# Patient Record
Sex: FEMALE | Race: OTHER | Employment: UNEMPLOYED | URBAN - METROPOLITAN AREA
[De-identification: names, ages, dates, MRNs, and addresses within clinical notes are randomized per-mention and may not be internally consistent; named-entity substitution may affect disease eponyms.]

---

## 2018-05-14 NOTE — LETTER
Date & Time: 7/16/2024, 10:06 PM  Patient: Trinity Smith  Encounter Provider(s):    Joseph Yusuf MD       To Whom It May Concern:    Trinity Smith was seen and treated in our department on 7/16/2024. She should not return to work until 07/22/2024 .    If you have any questions or concerns, please do not hesitate to call.        _____________________________  Physician/APC Signature            Consent (Lip)/Introductory Paragraph: The rationale for Mohs was explained to the patient and consent was obtained. The risks, benefits and alternatives to therapy were discussed in detail. Specifically, the risks of lip deformity, changes in the oral aperture, infection, scarring, bleeding, prolonged wound healing, incomplete removal, allergy to anesthesia, nerve injury and recurrence were addressed. Prior to the procedure, the treatment site was clearly identified and confirmed by the patient using a hand mirror. All components of Universal Protocol/PAUSE Rule completed.

## 2019-11-16 ENCOUNTER — LAB ENCOUNTER (OUTPATIENT)
Dept: LAB | Age: 54
End: 2019-11-16
Attending: FAMILY MEDICINE
Payer: COMMERCIAL

## 2019-11-16 ENCOUNTER — OFFICE VISIT (OUTPATIENT)
Dept: FAMILY MEDICINE CLINIC | Facility: CLINIC | Age: 54
End: 2019-11-16
Payer: COMMERCIAL

## 2019-11-16 VITALS
TEMPERATURE: 98 F | HEIGHT: 66 IN | DIASTOLIC BLOOD PRESSURE: 80 MMHG | BODY MASS INDEX: 31.98 KG/M2 | SYSTOLIC BLOOD PRESSURE: 129 MMHG | HEART RATE: 70 BPM | WEIGHT: 199 LBS

## 2019-11-16 DIAGNOSIS — E55.9 VITAMIN D DEFICIENCY: ICD-10-CM

## 2019-11-16 DIAGNOSIS — E11.9 CONTROLLED TYPE 2 DIABETES MELLITUS WITHOUT COMPLICATION, WITHOUT LONG-TERM CURRENT USE OF INSULIN (HCC): ICD-10-CM

## 2019-11-16 DIAGNOSIS — I10 ESSENTIAL HYPERTENSION: Primary | ICD-10-CM

## 2019-11-16 PROCEDURE — 82570 ASSAY OF URINE CREATININE: CPT

## 2019-11-16 PROCEDURE — 99203 OFFICE O/P NEW LOW 30 MIN: CPT | Performed by: FAMILY MEDICINE

## 2019-11-16 PROCEDURE — 82306 VITAMIN D 25 HYDROXY: CPT

## 2019-11-16 PROCEDURE — 84443 ASSAY THYROID STIM HORMONE: CPT

## 2019-11-16 PROCEDURE — 80061 LIPID PANEL: CPT

## 2019-11-16 PROCEDURE — 83036 HEMOGLOBIN GLYCOSYLATED A1C: CPT

## 2019-11-16 PROCEDURE — 90686 IIV4 VACC NO PRSV 0.5 ML IM: CPT | Performed by: FAMILY MEDICINE

## 2019-11-16 PROCEDURE — 36415 COLL VENOUS BLD VENIPUNCTURE: CPT

## 2019-11-16 PROCEDURE — 80053 COMPREHEN METABOLIC PANEL: CPT

## 2019-11-16 PROCEDURE — 82043 UR ALBUMIN QUANTITATIVE: CPT

## 2019-11-16 PROCEDURE — 85025 COMPLETE CBC W/AUTO DIFF WBC: CPT

## 2019-11-16 PROCEDURE — 90471 IMMUNIZATION ADMIN: CPT | Performed by: FAMILY MEDICINE

## 2019-11-16 RX ORDER — TELMISARTAN AND HYDROCHLORTHIAZIDE 80; 25 MG/1; MG/1
TABLET ORAL
Refills: 1 | COMMUNITY
Start: 2019-08-04 | End: 2019-11-16

## 2019-11-16 RX ORDER — FENOFIBRATE 160 MG/1
TABLET ORAL
Refills: 5 | COMMUNITY
Start: 2019-08-04 | End: 2019-11-16

## 2019-11-16 RX ORDER — METOPROLOL SUCCINATE 50 MG/1
TABLET, EXTENDED RELEASE ORAL
Qty: 90 TABLET | Refills: 2 | Status: SHIPPED | OUTPATIENT
Start: 2019-11-16 | End: 2020-08-15

## 2019-11-16 RX ORDER — ERGOCALCIFEROL 1.25 MG/1
CAPSULE ORAL
Qty: 12 CAPSULE | Refills: 3 | Status: SHIPPED | OUTPATIENT
Start: 2019-11-16 | End: 2020-08-15

## 2019-11-16 RX ORDER — ERGOCALCIFEROL 1.25 MG/1
CAPSULE ORAL
Refills: 3 | COMMUNITY
Start: 2019-08-03 | End: 2019-11-16

## 2019-11-16 RX ORDER — FENOFIBRATE 160 MG/1
TABLET ORAL
Qty: 90 TABLET | Refills: 2 | Status: SHIPPED | OUTPATIENT
Start: 2019-11-16 | End: 2020-08-15

## 2019-11-16 RX ORDER — HYDROCHLOROTHIAZIDE 25 MG/1
TABLET ORAL
COMMUNITY
End: 2019-11-16

## 2019-11-16 RX ORDER — ASPIRIN 81 MG/1
TABLET ORAL
COMMUNITY
Start: 2011-09-03 | End: 2019-11-16

## 2019-11-16 RX ORDER — METOPROLOL SUCCINATE 50 MG/1
TABLET, EXTENDED RELEASE ORAL
Refills: 1 | COMMUNITY
Start: 2019-08-04 | End: 2019-11-16

## 2019-11-16 RX ORDER — TELMISARTAN AND HYDROCHLORTHIAZIDE 80; 25 MG/1; MG/1
TABLET ORAL
Qty: 90 TABLET | Refills: 1 | Status: SHIPPED | OUTPATIENT
Start: 2019-11-16 | End: 2020-05-18

## 2019-11-19 NOTE — PROGRESS NOTES
Diabetes is not well controlled. I would like to add another medication called invokana. It will make her urinate more but it is taking out excess glucose. It will also help her lose more weight.

## 2020-02-01 ENCOUNTER — OFFICE VISIT (OUTPATIENT)
Dept: FAMILY MEDICINE CLINIC | Facility: CLINIC | Age: 55
End: 2020-02-01
Payer: COMMERCIAL

## 2020-02-01 ENCOUNTER — LAB ENCOUNTER (OUTPATIENT)
Dept: LAB | Age: 55
End: 2020-02-01
Attending: FAMILY MEDICINE
Payer: COMMERCIAL

## 2020-02-01 VITALS
DIASTOLIC BLOOD PRESSURE: 56 MMHG | WEIGHT: 193 LBS | HEIGHT: 66 IN | HEART RATE: 64 BPM | TEMPERATURE: 98 F | BODY MASS INDEX: 31.02 KG/M2 | SYSTOLIC BLOOD PRESSURE: 109 MMHG

## 2020-02-01 DIAGNOSIS — Z12.31 BREAST CANCER SCREENING BY MAMMOGRAM: ICD-10-CM

## 2020-02-01 DIAGNOSIS — I10 ESSENTIAL HYPERTENSION: Primary | ICD-10-CM

## 2020-02-01 DIAGNOSIS — E11.9 CONTROLLED TYPE 2 DIABETES MELLITUS WITHOUT COMPLICATION, WITHOUT LONG-TERM CURRENT USE OF INSULIN (HCC): ICD-10-CM

## 2020-02-01 LAB
CHOLEST SMN-MCNC: 168 MG/DL (ref ?–200)
EST. AVERAGE GLUCOSE BLD GHB EST-MCNC: 140 MG/DL (ref 68–126)
HBA1C MFR BLD HPLC: 6.5 % (ref ?–5.7)
HDLC SERPL-MCNC: 41 MG/DL (ref 40–59)
LDLC SERPL CALC-MCNC: 95 MG/DL (ref ?–100)
NONHDLC SERPL-MCNC: 127 MG/DL (ref ?–130)
PATIENT FASTING Y/N/NP: YES
TRIGL SERPL-MCNC: 161 MG/DL (ref 30–149)
VLDLC SERPL CALC-MCNC: 32 MG/DL (ref 0–30)

## 2020-02-01 PROCEDURE — 36415 COLL VENOUS BLD VENIPUNCTURE: CPT

## 2020-02-01 PROCEDURE — 80061 LIPID PANEL: CPT

## 2020-02-01 PROCEDURE — 99213 OFFICE O/P EST LOW 20 MIN: CPT | Performed by: FAMILY MEDICINE

## 2020-02-01 PROCEDURE — 83036 HEMOGLOBIN GLYCOSYLATED A1C: CPT

## 2020-02-01 NOTE — PROGRESS NOTES
Mercedes Weiner is a 48year old female. Patient presents with:  Medication Follow-Up: Invokana    HPI:   Labs done in November hgb a1c was 7. 1. Reports taking invokana now and glucose has improved. Reports in 120s.  Has appt coming up in 2/2020 for eye exam. to auscultation  CARDIO: RRR without murmur  EXTREMITIES: no cyanosis, clubbing or edema  FOOT: normal pedal pulses. Normal sensation. No ulcerations. ASSESSMENT AND PLAN:   1. Essential hypertension  BP stable. Continue current medications.      2. Con

## 2020-05-18 RX ORDER — TELMISARTAN AND HYDROCHLORTHIAZIDE 80; 25 MG/1; MG/1
TABLET ORAL
Qty: 90 TABLET | Refills: 1 | Status: SHIPPED | OUTPATIENT
Start: 2020-05-18 | End: 2020-08-15

## 2020-08-15 ENCOUNTER — OFFICE VISIT (OUTPATIENT)
Dept: FAMILY MEDICINE CLINIC | Facility: CLINIC | Age: 55
End: 2020-08-15
Payer: COMMERCIAL

## 2020-08-15 ENCOUNTER — LAB ENCOUNTER (OUTPATIENT)
Dept: LAB | Age: 55
End: 2020-08-15
Attending: FAMILY MEDICINE
Payer: COMMERCIAL

## 2020-08-15 VITALS
HEART RATE: 61 BPM | HEIGHT: 66 IN | DIASTOLIC BLOOD PRESSURE: 72 MMHG | TEMPERATURE: 98 F | SYSTOLIC BLOOD PRESSURE: 119 MMHG | WEIGHT: 191 LBS | BODY MASS INDEX: 30.7 KG/M2

## 2020-08-15 DIAGNOSIS — E55.9 VITAMIN D DEFICIENCY: ICD-10-CM

## 2020-08-15 DIAGNOSIS — E11.9 CONTROLLED TYPE 2 DIABETES MELLITUS WITHOUT COMPLICATION, WITHOUT LONG-TERM CURRENT USE OF INSULIN (HCC): ICD-10-CM

## 2020-08-15 DIAGNOSIS — I10 ESSENTIAL HYPERTENSION: Primary | ICD-10-CM

## 2020-08-15 LAB
ALBUMIN SERPL-MCNC: 3.4 G/DL (ref 3.4–5)
ALBUMIN/GLOB SERPL: 0.9 {RATIO} (ref 1–2)
ALP LIVER SERPL-CCNC: 38 U/L (ref 41–108)
ALT SERPL-CCNC: 37 U/L (ref 13–56)
ANION GAP SERPL CALC-SCNC: 6 MMOL/L (ref 0–18)
AST SERPL-CCNC: 28 U/L (ref 15–37)
BASOPHILS # BLD AUTO: 0.1 X10(3) UL (ref 0–0.2)
BASOPHILS NFR BLD AUTO: 1.1 %
BILIRUB SERPL-MCNC: 0.3 MG/DL (ref 0.1–2)
BUN BLD-MCNC: 20 MG/DL (ref 7–18)
BUN/CREAT SERPL: 21.1 (ref 10–20)
CALCIUM BLD-MCNC: 8.9 MG/DL (ref 8.5–10.1)
CHLORIDE SERPL-SCNC: 105 MMOL/L (ref 98–112)
CHOLEST SMN-MCNC: 159 MG/DL (ref ?–200)
CO2 SERPL-SCNC: 27 MMOL/L (ref 21–32)
CREAT BLD-MCNC: 0.95 MG/DL (ref 0.55–1.02)
CREAT UR-SCNC: 80.8 MG/DL
DEPRECATED RDW RBC AUTO: 39.6 FL (ref 35.1–46.3)
EOSINOPHIL # BLD AUTO: 0.42 X10(3) UL (ref 0–0.7)
EOSINOPHIL NFR BLD AUTO: 4.6 %
ERYTHROCYTE [DISTWIDTH] IN BLOOD BY AUTOMATED COUNT: 13.6 % (ref 11–15)
EST. AVERAGE GLUCOSE BLD GHB EST-MCNC: 140 MG/DL (ref 68–126)
GLOBULIN PLAS-MCNC: 3.9 G/DL (ref 2.8–4.4)
GLUCOSE BLD-MCNC: 106 MG/DL (ref 70–99)
HBA1C MFR BLD HPLC: 6.5 % (ref ?–5.7)
HCT VFR BLD AUTO: 35.7 % (ref 35–48)
HDLC SERPL-MCNC: 36 MG/DL (ref 40–59)
HGB BLD-MCNC: 11.3 G/DL (ref 12–16)
IMM GRANULOCYTES # BLD AUTO: 0.03 X10(3) UL (ref 0–1)
IMM GRANULOCYTES NFR BLD: 0.3 %
LDLC SERPL CALC-MCNC: 81 MG/DL (ref ?–100)
LYMPHOCYTES # BLD AUTO: 3.1 X10(3) UL (ref 1–4)
LYMPHOCYTES NFR BLD AUTO: 34.3 %
M PROTEIN MFR SERPL ELPH: 7.3 G/DL (ref 6.4–8.2)
MCH RBC QN AUTO: 25.5 PG (ref 26–34)
MCHC RBC AUTO-ENTMCNC: 31.7 G/DL (ref 31–37)
MCV RBC AUTO: 80.6 FL (ref 80–100)
MICROALBUMIN UR-MCNC: 19.1 MG/DL
MICROALBUMIN/CREAT 24H UR-RTO: 236.4 UG/MG (ref ?–30)
MONOCYTES # BLD AUTO: 0.57 X10(3) UL (ref 0.1–1)
MONOCYTES NFR BLD AUTO: 6.3 %
NEUTROPHILS # BLD AUTO: 4.83 X10 (3) UL (ref 1.5–7.7)
NEUTROPHILS # BLD AUTO: 4.83 X10(3) UL (ref 1.5–7.7)
NEUTROPHILS NFR BLD AUTO: 53.4 %
NONHDLC SERPL-MCNC: 123 MG/DL (ref ?–130)
OSMOLALITY SERPL CALC.SUM OF ELEC: 289 MOSM/KG (ref 275–295)
PATIENT FASTING Y/N/NP: YES
PATIENT FASTING Y/N/NP: YES
PLATELET # BLD AUTO: 402 10(3)UL (ref 150–450)
POTASSIUM SERPL-SCNC: 3.6 MMOL/L (ref 3.5–5.1)
RBC # BLD AUTO: 4.43 X10(6)UL (ref 3.8–5.3)
SODIUM SERPL-SCNC: 138 MMOL/L (ref 136–145)
TRIGL SERPL-MCNC: 212 MG/DL (ref 30–149)
TSI SER-ACNC: 2.09 MIU/ML (ref 0.36–3.74)
VLDLC SERPL CALC-MCNC: 42 MG/DL (ref 0–30)
WBC # BLD AUTO: 9.1 X10(3) UL (ref 4–11)

## 2020-08-15 PROCEDURE — 84443 ASSAY THYROID STIM HORMONE: CPT

## 2020-08-15 PROCEDURE — 80053 COMPREHEN METABOLIC PANEL: CPT

## 2020-08-15 PROCEDURE — 82570 ASSAY OF URINE CREATININE: CPT

## 2020-08-15 PROCEDURE — 85025 COMPLETE CBC W/AUTO DIFF WBC: CPT

## 2020-08-15 PROCEDURE — 82306 VITAMIN D 25 HYDROXY: CPT

## 2020-08-15 PROCEDURE — 99214 OFFICE O/P EST MOD 30 MIN: CPT | Performed by: FAMILY MEDICINE

## 2020-08-15 PROCEDURE — 36415 COLL VENOUS BLD VENIPUNCTURE: CPT

## 2020-08-15 PROCEDURE — 3074F SYST BP LT 130 MM HG: CPT | Performed by: FAMILY MEDICINE

## 2020-08-15 PROCEDURE — 3078F DIAST BP <80 MM HG: CPT | Performed by: FAMILY MEDICINE

## 2020-08-15 PROCEDURE — 82043 UR ALBUMIN QUANTITATIVE: CPT

## 2020-08-15 PROCEDURE — 3008F BODY MASS INDEX DOCD: CPT | Performed by: FAMILY MEDICINE

## 2020-08-15 PROCEDURE — 83036 HEMOGLOBIN GLYCOSYLATED A1C: CPT

## 2020-08-15 PROCEDURE — 80061 LIPID PANEL: CPT

## 2020-08-15 RX ORDER — FENOFIBRATE 160 MG/1
TABLET ORAL
Qty: 90 TABLET | Refills: 2 | Status: SHIPPED | OUTPATIENT
Start: 2020-08-15 | End: 2021-05-11

## 2020-08-15 RX ORDER — TELMISARTAN AND HYDROCHLORTHIAZIDE 80; 25 MG/1; MG/1
TABLET ORAL
Qty: 90 TABLET | Refills: 2 | Status: SHIPPED | OUTPATIENT
Start: 2020-08-15 | End: 2021-06-09

## 2020-08-15 RX ORDER — METOPROLOL SUCCINATE 50 MG/1
TABLET, EXTENDED RELEASE ORAL
Qty: 90 TABLET | Refills: 2 | Status: SHIPPED | OUTPATIENT
Start: 2020-08-15 | End: 2021-05-11

## 2020-08-15 RX ORDER — ERGOCALCIFEROL 1.25 MG/1
CAPSULE ORAL
Qty: 12 CAPSULE | Refills: 3 | Status: SHIPPED | OUTPATIENT
Start: 2020-08-15

## 2020-08-15 NOTE — PROGRESS NOTES
Jazmín Kirkpatrick is a 50year old female. Patient presents with:  Diabetes: follow up  Hypertension: follow up    HPI:   Here for DM follow up. Stopped invokana - had a lot of dryness in her throat.  Reports her glucose has been controlled 110 and 95 in after BMI 30.83 kg/m²   GENERAL: well developed, well nourished,in no apparent distress  SKIN: no rashes,no suspicious lesions  NECK: supple,no adenopathy,no bruits  LUNGS: clear to auscultation  CARDIO: RRR without murmur  EXTREMITIES: no cyanosis, clubbing or

## 2020-08-17 LAB — 25(OH)D3 SERPL-MCNC: 40.8 NG/ML (ref 30–100)

## 2020-08-24 NOTE — PROGRESS NOTES
Diabetes is stable with current medications but cholesterol still a bit elevated so I would like to add atorvastatin 10 mg in the evenings. If ok with that please send and repeat lipid panel in 3 months and cmp.  Also can take coenzyme q 10 100 mg daily

## 2020-10-10 ENCOUNTER — HOSPITAL ENCOUNTER (OUTPATIENT)
Dept: MAMMOGRAPHY | Facility: HOSPITAL | Age: 55
Discharge: HOME | End: 2020-10-10
Attending: FAMILY MEDICINE
Payer: COMMERCIAL

## 2020-10-10 DIAGNOSIS — Z12.31 BREAST CANCER SCREENING BY MAMMOGRAM: ICD-10-CM

## 2020-10-10 PROCEDURE — 77067 SCR MAMMO BI INCL CAD: CPT | Performed by: FAMILY MEDICINE

## 2020-10-10 PROCEDURE — 77063 BREAST TOMOSYNTHESIS BI: CPT | Performed by: FAMILY MEDICINE

## 2020-10-17 NOTE — PROGRESS NOTES
There is area of breast different on right compared to left - no specific mass so radiologist requesting old films.

## 2021-04-08 ENCOUNTER — IMMUNIZATION (OUTPATIENT)
Dept: LAB | Age: 56
End: 2021-04-08
Attending: HOSPITALIST
Payer: COMMERCIAL

## 2021-04-08 DIAGNOSIS — Z23 NEED FOR VACCINATION: Primary | ICD-10-CM

## 2021-04-08 PROCEDURE — 0001A SARSCOV2 VAC 30MCG/0.3ML IM: CPT

## 2021-04-29 ENCOUNTER — IMMUNIZATION (OUTPATIENT)
Dept: LAB | Age: 56
End: 2021-04-29
Attending: HOSPITALIST
Payer: COMMERCIAL

## 2021-04-29 DIAGNOSIS — Z23 NEED FOR VACCINATION: Primary | ICD-10-CM

## 2021-04-29 PROCEDURE — 0002A SARSCOV2 VAC 30MCG/0.3ML IM: CPT

## 2021-05-10 NOTE — TELEPHONE ENCOUNTER
•  Metoprolol Succinate ER 50 MG Oral Tablet 24 Hr, TK 1 T PO QD, Disp: 90 tablet, Rfl: 2    •  Fenofibrate 160 MG Oral Tab, TK 1 T PO QD, Disp: 90 tablet, Rfl: 2    •  metFORMIN HCl 1000 MG Oral Tab, TK 1 T PO BID AFTER MEALS, Disp: 180 tablet, Rfl: 2

## 2021-05-11 RX ORDER — FENOFIBRATE 160 MG/1
TABLET ORAL
Qty: 90 TABLET | Refills: 2 | Status: SHIPPED | OUTPATIENT
Start: 2021-05-11

## 2021-05-11 RX ORDER — METOPROLOL SUCCINATE 50 MG/1
TABLET, EXTENDED RELEASE ORAL
Qty: 90 TABLET | Refills: 2 | Status: SHIPPED | OUTPATIENT
Start: 2021-05-11

## 2021-06-05 ENCOUNTER — LAB ENCOUNTER (OUTPATIENT)
Dept: LAB | Age: 56
End: 2021-06-05
Attending: FAMILY MEDICINE
Payer: COMMERCIAL

## 2021-06-05 DIAGNOSIS — E11.9 TYPE 2 DIABETES MELLITUS WITHOUT COMPLICATION, WITHOUT LONG-TERM CURRENT USE OF INSULIN (HCC): ICD-10-CM

## 2021-06-05 PROCEDURE — 80061 LIPID PANEL: CPT

## 2021-06-05 PROCEDURE — 80053 COMPREHEN METABOLIC PANEL: CPT

## 2021-06-05 PROCEDURE — 36415 COLL VENOUS BLD VENIPUNCTURE: CPT

## 2021-06-09 ENCOUNTER — OFFICE VISIT (OUTPATIENT)
Dept: FAMILY MEDICINE CLINIC | Facility: CLINIC | Age: 56
End: 2021-06-09
Payer: COMMERCIAL

## 2021-06-09 VITALS
HEIGHT: 66 IN | BODY MASS INDEX: 30.76 KG/M2 | SYSTOLIC BLOOD PRESSURE: 113 MMHG | DIASTOLIC BLOOD PRESSURE: 68 MMHG | HEART RATE: 63 BPM | TEMPERATURE: 97 F | WEIGHT: 191.38 LBS

## 2021-06-09 DIAGNOSIS — I10 ESSENTIAL HYPERTENSION: ICD-10-CM

## 2021-06-09 DIAGNOSIS — E11.9 CONTROLLED TYPE 2 DIABETES MELLITUS WITHOUT COMPLICATION, WITHOUT LONG-TERM CURRENT USE OF INSULIN (HCC): ICD-10-CM

## 2021-06-09 DIAGNOSIS — Z01.419 ENCOUNTER FOR WELL WOMAN EXAM WITH ROUTINE GYNECOLOGICAL EXAM: Primary | ICD-10-CM

## 2021-06-09 DIAGNOSIS — Z12.31 VISIT FOR SCREENING MAMMOGRAM: ICD-10-CM

## 2021-06-09 DIAGNOSIS — E55.9 VITAMIN D DEFICIENCY: ICD-10-CM

## 2021-06-09 PROCEDURE — 99396 PREV VISIT EST AGE 40-64: CPT | Performed by: FAMILY MEDICINE

## 2021-06-09 PROCEDURE — 83036 HEMOGLOBIN GLYCOSYLATED A1C: CPT | Performed by: FAMILY MEDICINE

## 2021-06-09 PROCEDURE — 3078F DIAST BP <80 MM HG: CPT | Performed by: FAMILY MEDICINE

## 2021-06-09 PROCEDURE — 3008F BODY MASS INDEX DOCD: CPT | Performed by: FAMILY MEDICINE

## 2021-06-09 PROCEDURE — 3074F SYST BP LT 130 MM HG: CPT | Performed by: FAMILY MEDICINE

## 2021-06-09 PROCEDURE — 36415 COLL VENOUS BLD VENIPUNCTURE: CPT | Performed by: FAMILY MEDICINE

## 2021-06-09 RX ORDER — TELMISARTAN AND HYDROCHLORTHIAZIDE 80; 25 MG/1; MG/1
TABLET ORAL
Qty: 90 TABLET | Refills: 2 | Status: SHIPPED | OUTPATIENT
Start: 2021-06-09 | End: 2021-08-18

## 2021-06-09 NOTE — PROGRESS NOTES
HPI:   Peterson Lemus is a 50year old female who presents for a complete physical exam.    Pt here for regular gyne exam and diabetes check up   Checking her glucose regularly and controlled. 110s.    Wt Readings from Last 3 Encounters:  06/09/21 : 191 lb exertion, denies orthopnea  CARDIOVASCULAR: denies chest pain on exertion  GI: denies abdominal pain,denies heartburn  : denies dysuria, vaginal discharge or itching,irregular menses  MUSCULOSKELETAL: denies back pain  NEURO: denies headaches  PSYCHE: de mammogram    - [facility] MAHAMED 2D+3D SCREENING BILAT (CPT=[id number]);  Future       Shameka Friend MD  6/9/2021  9:44 AM

## 2021-06-21 NOTE — PROGRESS NOTES
HPV test was normal/negative but atypical cells on the pap smear and so pap smear should be repeated in 1 year.

## 2021-08-18 NOTE — TELEPHONE ENCOUNTER
Refill passed per [address] Prescriptions   Pending Prescriptions Disp Refills    TELMISARTAN-HCTZ 80-25 MG Oral Tab [Pharmacy Med Name: TELMISARTAN/HCTZ 80-25MG TABS] 90 tablet 2     Sig: TAKE 1 TABLET BY MOUTH EVERY MORNING        Hypertensive Medications Protocol Passed - 8/18/2021 11:39 AM        Passed - CMP or BMP in past 12 months        Passed - Appointment in past 6 or next 3 months        Passed - GFR Non- > 50     Lab Results   Component Value Date    GFRNAA 80 06/05/2021                     Recent Outpatient Visits              2 months ago Encounter for well woman exam with routine gynecological exam    Emanuel Abarca MD    Office Visit    1 year ago Essential hypertension    Latonia Nichole MD    Office Visit    1 year ago Essential hypertension    Melba Nichole Erlene Lesser, MD    Office Visit    1 year ago Essential hypertension    Melba Nichole Erlene Lesser, MD    Office Visit

## 2021-11-06 ENCOUNTER — HOSPITAL ENCOUNTER (OUTPATIENT)
Dept: MAMMOGRAPHY | Facility: HOSPITAL | Age: 56
Discharge: HOME | End: 2021-11-06
Attending: FAMILY MEDICINE
Payer: COMMERCIAL

## 2021-11-06 DIAGNOSIS — Z12.31 VISIT FOR SCREENING MAMMOGRAM: ICD-10-CM

## 2021-11-06 PROCEDURE — 77067 SCR MAMMO BI INCL CAD: CPT | Performed by: FAMILY MEDICINE

## 2021-11-06 PROCEDURE — 77063 BREAST TOMOSYNTHESIS BI: CPT | Performed by: FAMILY MEDICINE

## 2022-02-07 NOTE — TELEPHONE ENCOUNTER
Patient is calling to request a follow up appt for 2/19/22 anytime. Patient has scheduled a follow up appt on 3/10/22 and is on the wait list.  Are your able to see patient on day requested; please advise.

## 2022-02-07 NOTE — TELEPHONE ENCOUNTER
Im already at max for that day. You can offer 3/5 as openings still there (res 24 ok) if needs a Saturday.

## 2022-02-16 NOTE — ASSESSMENT & PLAN NOTE
RX for separate distance and reading glasses per patient's choice. Alternately, patient can try +1.50 over the counter for distance and +3.50 over the counter for reading.

## 2022-02-16 NOTE — PATIENT INSTRUCTIONS
Hyperopia of both eyes with astigmatism and presbyopia  RX for separate distance and reading glasses per patient's choice. Alternately, patient can try +1.50 over the counter for distance and +3.50 over the counter for reading. Age-related nuclear cataract of both eyes  Discussed mild cataracts in both eyes that are not affecting vision and are not surgical at this time. Floater, vitreous, bilateral  No treatment. Diabetes mellitus type 2 without retinopathy ([address]  Diabetes type II: no background of retinopathy, no signs of neovascularization noted. Discussed ocular and systemic benefits of blood sugar control. Diagnosis and treatment discussed in detail with patient.

## 2022-02-17 NOTE — PROGRESS NOTES
Diabetes is worsening. Rest of the labs are stable - will discuss at upcoming appointment. Needs to focus on better diet and more regular exercise. Will review at appt and add medications to lower it further.

## 2022-02-23 NOTE — TELEPHONE ENCOUNTER
please see pt message below and advise. I did call pt pharmacy and was informed that we can try sending two different scripts or you can prescribe a different one.  I have pended the two different script for your review and approval.

## 2022-02-23 NOTE — TELEPHONE ENCOUNTER
Last Vit D level was 50.1 on 2/16/2022. Please review; protocol failed/no protocol.      Requested Prescriptions   Pending Prescriptions Disp Refills    ergocalciferol 1.25 MG (74458 UT) Oral Cap [Pharmacy Med Name: VITAMIN D2 50,000IU (ERGO) CAP RX] 12 capsule 3     Sig: TAKE 1 CAPSULE BY MOUTH EVERY WEEK        There is no refill protocol information for this order       Signed Prescriptions Disp Refills    metoprolol succinate ER 50 MG Oral Tablet 24 Hr 90 tablet 1     Sig: TAKE 1 TABLET BY MOUTH EVERY DAY        Hypertensive Medications Protocol Passed - 2/23/2022  3:41 PM        Passed - CMP or BMP in past 12 months        Passed - Appointment in past 6 or next 3 months        Passed - GFR Non- > 50     Lab Results   Component Value Date    GFRNAA 71 02/16/2022                        Recent Outpatient Visits              1 week ago Diabetes mellitus type 2 without retinopathy [facility])    [facility] Kyle Mckeon MD    Office Visit    8 months ago Encounter for well woman exam with routine gynecological exam    [address] MD    Office Visit    1 year ago Essential hypertension    [address], Gorden Gowers, MD    Office Visit    2 years ago Essential hypertension    [address], Gorden Gowers, MD    Office Visit    2 years ago Essential hypertension    [address], Gorden Gowers, MD    Office Visit             Future Appointments         Provider Department Appt Notes    In 1 week Sonia Sabillon MD [address] follow up BP and diabetes  care partner policy informed  [id number] Per PCP Encounter 2/7/22

## 2022-02-23 NOTE — TELEPHONE ENCOUNTER
Refill passed per [facility] protocol.      Requested Prescriptions   Pending Prescriptions Disp Refills    ERGOCALCIFEROL 1.25 MG (28777 UT) Oral Cap [Pharmacy Med Name: VITAMIN D2 50,000IU (ERGO) CAP RX] 12 capsule 3     Sig: TAKE 1 CAPSULE BY MOUTH EVERY WEEK        There is no refill protocol information for this order        METOPROLOL SUCCINATE ER 50 MG Oral Tablet 24 Hr [Pharmacy Med Name: METOPROLOL ER SUCCINATE 50MG TABS] 90 tablet 2     Sig: TAKE 1 TABLET BY MOUTH EVERY DAY        Hypertensive Medications Protocol Passed - 2/23/2022  3:41 PM        Passed - CMP or BMP in past 12 months        Passed - Appointment in past 6 or next 3 months        Passed - GFR Non- > 50     Lab Results   Component Value Date    [place] [address] 02/16/2022                        Future Appointments         Provider Department Appt Notes    In 1 week Jamil Carter MD [facility], Nikita Castrejon follow up BP and diabetes  care partner policy informed  [id number] Per PCP Encounter 2/7/22             Recent Outpatient Visits              1 week ago Diabetes mellitus type 2 without retinopathy [facility])    [facility] Katherine Chao MD    Office Visit    8 months ago Encounter for well woman exam with routine gynecological exam    Nicholas TranPatrick MD    Office Visit    1 year ago Essential hypertension    [address] Norval Smock, MD    Office Visit    2 years ago Essential hypertension    [address] Norval Smock, MD    Office Visit    2 years ago Essential hypertension    [address] Norval Smock, MD    Office Visit

## 2022-11-20 NOTE — TELEPHONE ENCOUNTER
Refill passed per [facility] protocol.     Requested Prescriptions   Pending Prescriptions Disp Refills    FENOFIBRATE 160 MG Oral Tab [Pharmacy Med Name: FENOFIBRATE 160MG TABLETS] 90 tablet 2     Sig: TAKE 1 TABLET BY MOUTH EVERY DAY       Cholesterol Medication Protocol Passed - 11/19/2022  2:24 PM        Passed - ALT in past 12 months        Passed - LDL in past 12 months        Passed - Last ALT < 80     Lab Results   Component Value Date    ALT 44 02/16/2022             Passed - Last LDL < 130     Lab Results   Component Value Date    LDL 98 02/16/2022             Passed - In person appointment or virtual visit in the past 12 mos or appointment in next 3 mos     Recent Outpatient Visits              8 months ago Controlled type 2 diabetes mellitus without complication, without long-term current use of insulin [facility])    Nima Toro MD    Office Visit    9 months ago Diabetes mellitus type 2 without retinopathy [facility])    [facility] Mukesh Meyers MD    Office Visit    1 year ago Encounter for well woman exam with routine gynecological exam    Nima Toro MD    Office Visit    2 years ago Essential hypertension    Delisa Toro Atha Gulling, MD    Office Visit    2 years ago Essential hypertension    Delisa Toro Atha Gulling, MD    Office Visit          Future Appointments         Provider Department Appt Notes    In 1 month Misty Ibrahim MD [facility], [address], [employer] blood pressure and diabetes follow up / declined to schedule physical at this time                 METFORMIN HCL 1000 MG Oral Tab [Pharmacy Med Name: METFORMIN 1000MG TABLETS] 180 tablet 2     Sig: TAKE 1 TABLET BY MOUTH TWICE DAILY AFTER MEALS       Diabetes Medication Protocol Failed - 11/19/2022  2:24 PM        Failed - Last A1C < 7.5 and within past 6 months     Lab Results   Component Value Date    A1C 7.3 (H) 02/16/2022             Passed - In person appointment or virtual visit in the past 6 mos or appointment in next 3 mos     Recent Outpatient Visits              8 months ago Controlled type 2 diabetes mellitus without complication, without long-term current use of insulin [facility])    Pardeep Alvarado MD    Office Visit    9 months ago Diabetes mellitus type 2 without retinopathy [facility])    [facility] Elle Dunaway MD    Office Visit    1 year ago Encounter for well woman exam with routine gynecological exam    Pardeep Alvarado MD    Office Visit    2 years ago Essential hypertension    Gene Alvarado [place] Parents, MD    Office Visit    2 years ago Essential hypertension    Gene Alvarado [place] Parents, MD    Office Visit          Future Appointments         Provider Department Appt Notes    In 1 month Ana Shepherd MD [address], [place] blood pressure and diabetes follow up / declined to schedule physical at this time               Passed [facility] or GFRNAA > 50     GFR Evaluation  GFRNAA: 71 , resulted on 2/16/2022          Passed - GFR in the past 12 months             Recent Outpatient Visits              8 months ago Controlled type 2 diabetes mellitus without complication, without long-term current use of insulin [facility])    Gene Alvarado [place] Parents, MD    Office Visit    9 months ago Diabetes mellitus type 2 without retinopathy [facility])    [facility] Elle Dunaway MD    Office Visit    1 year ago Encounter for well woman exam with routine gynecological exam    Leticia Velázquez MD    Office Visit    2 years ago Essential hypertension    [address], Le Thomas MD    Office Visit    2 years ago Essential hypertension    [address], Zenon Sanchez MD    Office Visit            Future Appointments         Provider Department Appt Notes    In 1 month Fatemeh Mcintosh MD [facility], [facility], [address], [place] blood pressure and diabetes follow up / declined to schedule physical at this time

## 2022-11-20 NOTE — TELEPHONE ENCOUNTER
Refill passed per [address] [place] protocol.     Requested Prescriptions   Pending Prescriptions Disp Refills    FENOFIBRATE 160 MG Oral Tab [Pharmacy Med Name: FENOFIBRATE 160MG TABLETS] 90 tablet 2     Sig: TAKE 1 TABLET BY MOUTH EVERY DAY       Cholesterol Medication Protocol Passed - 11/19/2022  2:24 PM        Passed - ALT in past 12 months        Passed - LDL in past 12 months        Passed - Last ALT < 80     Lab Results   Component Value Date    ALT 44 02/16/2022             Passed - Last LDL < 130     Lab Results   Component Value Date    LDL 98 02/16/2022             Passed - In person appointment or virtual visit in the past 12 mos or appointment in next 3 mos     Recent Outpatient Visits              8 months ago Controlled type 2 diabetes mellitus without complication, without long-term current use of insulin [facility])    [address] MD    Office Visit    9 months ago Diabetes mellitus type 2 without retinopathy [facility])    [facility] Carolina Castro MD    Office Visit    1 year ago Encounter for well woman exam with routine gynecological exam    [address] MD    Office Visit    2 years ago Essential hypertension    [address] Maron Love, MD    Office Visit    2 years ago Essential hypertension    [address] Maron Love, MD    Office Visit          Future Appointments         Provider Department Appt Notes    In 1 month Cesario Vegas MD [address], [address] blood pressure and diabetes follow up / declined to schedule physical at this time                 METFORMIN HCL 1000 MG Oral Tab [Pharmacy Med Name: METFORMIN 1000MG TABLETS] 180 tablet 2     Sig: TAKE 1 TABLET BY MOUTH TWICE DAILY AFTER MEALS       Diabetes Medication Protocol Failed - 11/19/2022  2:24 PM        Failed - Last A1C < 7.5 and within past 6 months     Lab Results   Component Value Date    A1C 7.3 (H) 02/16/2022             Passed - In person appointment or virtual visit in the past 6 mos or appointment in next 3 mos     Recent Outpatient Visits              8 months ago Controlled type 2 diabetes mellitus without complication, without long-term current use of insulin [facility])    [address] MD    Office Visit    9 months ago Diabetes mellitus type 2 without retinopathy [facility])    [facility] Giovany Ravi MD    Office Visit    1 year ago Encounter for well woman exam with routine gynecological exam    [address] MD    Office Visit    2 years ago Essential hypertension    [address] Anette Divers, MD    Office Visit    2 years ago Essential hypertension    [address] Anette Divers, MD    Office Visit          Future Appointments         Provider Department Appt Notes    In 1 month Edward Martinez MD [place] [address], [place] blood pressure and diabetes follow up / declined to schedule physical at this time               Passed [facility] or GFRNAA > 50     GFR Evaluation  GFRNAA: 71 , resulted on 2/16/2022          Passed - GFR in the past 12 months             Recent Outpatient Visits              8 months ago Controlled type 2 diabetes mellitus without complication, without long-term current use of insulin [facility])    [address] Anette Divers, MD    Office Visit    9 months ago Diabetes mellitus type 2 without retinopathy [facility])    [facility] Giovany Ravi MD    Office Visit    1 year ago Encounter for well woman exam with routine gynecological exam    Lolis Yanez MD    Office Visit    2 years ago Essential hypertension    [facility], [address], Kendy Avila MD    Office Visit    2 years ago Essential hypertension    [address], Kwesi Sanabria MD    Office Visit            Future Appointments         Provider Department Appt Notes    In 1 month Patricia Wolf MD [facility], [facility], [address] blood pressure and diabetes follow up / declined to schedule physical at this time

## 2022-11-20 NOTE — TELEPHONE ENCOUNTER
Please review Protocol Failed/No Protocol        Requested Prescriptions   Pending Prescriptions Disp Refills    metFORMIN HCl 1000 MG Oral Tab [Pharmacy Med Name: METFORMIN 1000MG TABLETS] 180 tablet 2     Sig: TAKE 1 TABLET BY MOUTH TWICE DAILY AFTER MEALS       Diabetes Medication Protocol Failed - 11/19/2022  2:24 PM        Failed - Last A1C < 7.5 and within past 6 months     Lab Results   Component Value Date    A1C 7.3 (H) 02/16/2022             Passed - In person appointment or virtual visit in the past 6 mos or appointment in next 3 mos     Recent Outpatient Visits              8 months ago Controlled type 2 diabetes mellitus without complication, without long-term current use of insulin [facility])    Estrella Hagan MD    Office Visit    9 months ago Diabetes mellitus type 2 without retinopathy [facility])    [facility] Robin Gomez MD    Office Visit    1 year ago Encounter for well woman exam with routine gynecological exam    Estrella Hagan MD    Office Visit    2 years ago Essential hypertension    Patito Hagan Calhoun Grills, MD    Office Visit    2 years ago Essential hypertension    Patito Hagan [place] Grills, MD    Office Visit          Future Appointments         Provider Department Appt Notes    In 1 month Marva Morales MD [facility], [facility], [address] , [place] blood pressure and diabetes follow up / declined to schedule physical at this time               Passed [facility] or GFRNAA > 50     GFR Evaluation  GFRNAA: 71 , resulted on 2/16/2022          Passed - GFR in the past 12 months         Signed Prescriptions Disp Refills    Fenofibrate 160 MG Oral Tab 90 tablet 1     Sig: TAKE 1 TABLET BY MOUTH EVERY DAY       Cholesterol Medication Protocol Passed - 11/19/2022  2:24 PM        Passed - ALT in past 12 months        Passed - LDL in past 12 months        Passed - Last ALT < 80     Lab Results   Component Value Date    ALT 44 02/16/2022             Passed - Last LDL < 130     Lab Results   Component Value Date    LDL 98 02/16/2022             Passed - In person appointment or virtual visit in the past 12 mos or appointment in next 3 mos     Recent Outpatient Visits              8 months ago Controlled type 2 diabetes mellitus without complication, without long-term current use of insulin [facility])    [address] Candi Marcus, MD    Office Visit    9 months ago Diabetes mellitus type 2 without retinopathy [facility])    [facility] Juan Hubbard MD    Office Visit    1 year ago Encounter for well woman exam with routine gynecological exam    [address] Candi Marcus, MD    Office Visit    2 years ago Essential hypertension    [address] Candi Marcus, MD    Office Visit    2 years ago Essential hypertension    [address] Solar, Maynard Sakai, MD    Office Visit          Future Appointments         Provider Department Appt Notes    In 1 month Aj Coleman MD [address], [place] blood pressure and diabetes follow up / declined to schedule physical at this time                    Future Appointments         Provider Department Appt Notes    In 1 month Aj Coleman MD [address], [place] blood pressure and diabetes follow up / declined to schedule physical at this time            Recent Outpatient Visits              8 months ago Controlled type 2 diabetes mellitus without complication, without long-term current use of insulin [facility])    [address] Maynard Sakai, MD    Office Visit    9 months ago Diabetes mellitus type 2 without retinopathy [facility])    [facility] Juan Hubbard MD    Office Visit 1 year ago Encounter for well woman exam with routine gynecological exam    Jaquelin Land MD    Office Visit    2 years ago Essential hypertension    Jaquelin Land MD    Office Visit    2 years ago Essential hypertension    Marshal Kenny, Mark Negrete MD    Office Visit

## 2022-12-07 NOTE — PROGRESS NOTES
Hi Trinity - Diabetes is a bit better controlled but triglycerides are higher. Please make sure you are following low fat healthy diabetic diet. I am placing a referral for Valarie - Diabetes educator/dietician at [facility]. Please call to schedule with her.  Will review at your appointment. - Dr. Santhosh Hurley

## 2023-10-18 NOTE — PROGRESS NOTES
Diabetes can be better - goal A1C below 7. Sending in another medication - jardiance 10 mg daily in morning - will also help with weight loss. Will urinate more with the medication - drink plenty of water. Schedule follow up with me in 3 months. Liver enzymes are mildly elevated concerning for fatty liver disease.  Continue to work on healthy diet and regular exercise. - Dr. Monroe Fam

## 2024-02-28 NOTE — ASSESSMENT & PLAN NOTE
Discussed mild cataracts in both eyes that are not affecting vision and are not surgical at this time.    RX for separate distance and reading glasses per patient's choice.   Patient will get distance only and continue with over the counter glasses for reading.  She could increase her over the counter reading only glasses to +3.50.

## 2024-02-28 NOTE — ASSESSMENT & PLAN NOTE
Diabetes type II: no background of retinopathy, no signs of neovascularization noted.  Discussed ocular and systemic benefits of blood sugar control.  Diagnosis and treatment discussed in detail with patient.

## 2024-02-28 NOTE — PATIENT INSTRUCTIONS
Diabetes mellitus type 2 without retinopathy (HCC)  Diabetes type II: no background of retinopathy, no signs of neovascularization noted.  Discussed ocular and systemic benefits of blood sugar control.  Diagnosis and treatment discussed in detail with patient.      Floater, vitreous, bilateral  No treatment.     Age-related nuclear cataract of both eyes  Discussed mild cataracts in both eyes that are not affecting vision and are not surgical at this time.

## 2024-02-28 NOTE — PROGRESS NOTES
Trinity Smith is a 58 year old female.    HPI:     HPI    Ep here for a diabetic eye exam. She states her vision feels like it has gotten worse for distance. She would like a new RX today.    Pt has been a diabetic for 6 years       Pt's diabetes is currently controlled by pills   Pt checks BS daily   Pt's last blood sugar was 89 on 02/26/24    Last HA1C was 7.1 on 10/14/23  Endocrinologist: none      Last edited by Denia Solorzano on 2/28/2024 11:14 AM.        Patient History:  Past Medical History:   Diagnosis Date    Diabetes (HCC)     Essential hypertension     Hyperlipidemia        Surgical History: Trinity Smith has a past surgical history that includes tubal ligation and colonoscopy (2018).    Family History   Problem Relation Age of Onset    Diabetes Father     Diabetes Mother     Diabetes Daughter     Hypertension Daughter     Diabetes Son     Hypertension Son     Breast Cancer Neg     Ovarian Cancer Neg     Glaucoma Neg     Macular degeneration Neg        Social History:   Social History     Socioeconomic History    Marital status:    Tobacco Use    Smoking status: Never    Smokeless tobacco: Never   Vaping Use    Vaping Use: Never used   Substance and Sexual Activity    Alcohol use: Yes     Comment: social    Drug use: Never       Medications:  Current Outpatient Medications   Medication Sig Dispense Refill    empagliflozin (JARDIANCE) 10 MG Oral Tab Take 1 tablet (10 mg total) by mouth daily. 90 tablet 4    metFORMIN HCl 1000 MG Oral Tab TAKE 1 TABLET BY MOUTH TWICE DAILY AFTER MEALS 180 tablet 4    FARXIGA 10 MG Oral Tab       telmisartan 80 MG Oral Tab Take 1 tablet (80 mg total) by mouth daily. 90 tablet 4    hydroCHLOROthiazide 25 MG Oral Tab Take 1 tablet (25 mg total) by mouth daily. 90 tablet 4    metoprolol succinate ER 50 MG Oral Tablet 24 Hr Take 1 tablet (50 mg total) by mouth daily. 90 tablet 4    ergocalciferol 1.25 MG (27242 UT) Oral Cap TAKE 1 CAPSULE BY MOUTH EVERY  WEEK 12 capsule 3    Fenofibrate 160 MG Oral Tab TAKE 1 TABLET BY MOUTH EVERY DAY 90 tablet 4    Lancets Does not apply Misc Check glucose daily 100 each 4    Glucose Blood (ONETOUCH ULTRA) In Vitro Strip Check glucose daily. 100 each 4    Blood Glucose Monitoring Suppl Does not apply Kit Glucose meter - covered by insurance. 100 lancets and strips with 5 refills. Check glucose daily 1 kit 0    Coenzyme Q10 100 MG Oral Cap  (Patient not taking: Reported on 3/5/2022) 30 capsule 0       Allergies:  Allergies   Allergen Reactions    Atorvastatin SWELLING    Lisinopril Coughing       ROS:       PHYSICAL EXAM:     Base Eye Exam       Visual Acuity (Snellen - Linear)         Right Left    Dist sc 20/50 +2 20/40 -2    Dist cc 20/30 +1 20/30 +2    Dist ph sc 20/25 -3 2030    Near cc 20/30 20/40   NVA done with OTC reading glasses +2.75             Tonometry (Icare, 10:56 AM)         Right Left    Pressure 20 20              Pupils         Pupils    Right PERRL    Left PERRL              Visual Fields         Left Right     Full Full              Extraocular Movement         Right Left     Full, Ortho Full, Ortho              Neuro/Psych       Oriented x3: Yes              Dilation       Both eyes: 1.0% Mydriacyl and 2.5% Warner Synephrine @ 10:55 AM              Dilation #2       Both eyes: 1.0% Mydriacyl and 2.5% Warner Synephrine @ 10:56 AM                  Slit Lamp and Fundus Exam       Slit Lamp Exam         Right Left    Lids/Lashes Dermatochalasis, Meibomian gland dysfunction Dermatochalasis, Meibomian gland dysfunction    Conjunctiva/Sclera Normal Normal    Cornea Clear Clear    Anterior Chamber Deep and quiet Deep and quiet    Iris Normal Normal    Lens Trace Nuclear sclerosis Trace Nuclear sclerosis    Vitreous Vitreous floaters Vitreous floaters              Fundus Exam         Right Left    Disc Good rim, Temporal crescent Good rim, Temporal crescent    C/D Ratio 0.4 0.4    Macula Normal- no BDR Normal- no BDR     Vessels Normal Normal    Periphery Normal Normal                  Refraction       Wearing Rx         Sphere Cylinder Axis    Right +2.75 Sphere     Left +2.75 Sphere       Age: 3yr    Type: OTC reading only              Wearing Rx #2         Sphere Cylinder Axis    Right +1.00 +0.50 100    Left +0.75 +0.50 080      Age: 7yrs    Type: Distance only              Manifest Refraction (Auto)         Sphere Cylinder Randolph Dist VA Add Near VA    Right +1.75 +0.50 090       Left +1.50 +0.75 070                 Manifest Refraction #2         Sphere Cylinder Randolph Dist VA Add Near VA    Right +1.50 +0.50 095 20/20 +2.00 20/20    Left +1.25 +0.75 070 20/20 +2.00 20/20              Final Rx         Sphere Cylinder Randolph Dist VA Near VA    Right +1.50 +0.50 095 20/20     Left +1.25 +0.75 070 20/20       Type: Distance only              Final Rx #2         Sphere Cylinder Randolph Dist VA Near VA    Right +3.50 +0.50 095  20/20    Left +3.25 +0.75 070  20/20      Type: Reading only                     ASSESSMENT/PLAN:     Diagnoses and Plan:     Diabetes mellitus type 2 without retinopathy (HCC)  Diabetes type II: no background of retinopathy, no signs of neovascularization noted.  Discussed ocular and systemic benefits of blood sugar control.  Diagnosis and treatment discussed in detail with patient.      Floater, vitreous, bilateral  No treatment.     Age-related nuclear cataract of both eyes  Discussed mild cataracts in both eyes that are not affecting vision and are not surgical at this time.    RX for separate distance and reading glasses per patient's choice.   Patient will get distance only and continue with over the counter glasses for reading.  She could increase her over the counter reading only glasses to +3.50.      No orders of the defined types were placed in this encounter.      Meds This Visit:  Requested Prescriptions      No prescriptions requested or ordered in this encounter        Follow up instructions:  Return in  about 1 year (around 2/28/2025) for Diabetic eye exam.    2/28/2024  Scribed by: Michael Colón MD

## 2024-03-20 NOTE — PROGRESS NOTES
Trinity Smith is a 58 year old female.   Chief Complaint   Patient presents with    Diabetes     6 months     HPI:   Reports glucose at home is controlled. Eating better and no side effects with medications.   Current Outpatient Medications on File Prior to Visit   Medication Sig Dispense Refill    empagliflozin (JARDIANCE) 10 MG Oral Tab Take 1 tablet (10 mg total) by mouth daily. 90 tablet 4    metFORMIN HCl 1000 MG Oral Tab TAKE 1 TABLET BY MOUTH TWICE DAILY AFTER MEALS 180 tablet 4    FARXIGA 10 MG Oral Tab       telmisartan 80 MG Oral Tab Take 1 tablet (80 mg total) by mouth daily. 90 tablet 4    hydroCHLOROthiazide 25 MG Oral Tab Take 1 tablet (25 mg total) by mouth daily. 90 tablet 4    metoprolol succinate ER 50 MG Oral Tablet 24 Hr Take 1 tablet (50 mg total) by mouth daily. 90 tablet 4    ergocalciferol 1.25 MG (59999 UT) Oral Cap TAKE 1 CAPSULE BY MOUTH EVERY WEEK 12 capsule 3    Fenofibrate 160 MG Oral Tab TAKE 1 TABLET BY MOUTH EVERY DAY 90 tablet 4    Lancets Does not apply Misc Check glucose daily 100 each 4    Glucose Blood (ONETOUCH ULTRA) In Vitro Strip Check glucose daily. 100 each 4    Coenzyme Q10 100 MG Oral Cap  30 capsule 0    Blood Glucose Monitoring Suppl Does not apply Kit Glucose meter - covered by insurance. 100 lancets and strips with 5 refills. Check glucose daily 1 kit 0     No current facility-administered medications on file prior to visit.      Past Medical History:   Diagnosis Date    Diabetes (HCC)     Essential hypertension     Hyperlipidemia       Social History:  Social History     Socioeconomic History    Marital status:    Tobacco Use    Smoking status: Never    Smokeless tobacco: Never   Vaping Use    Vaping Use: Never used   Substance and Sexual Activity    Alcohol use: Yes     Comment: social    Drug use: Never        REVIEW OF SYSTEMS:   GENERAL HEALTH: feels well otherwise  SKIN: denies any unusual skin lesions or rashes  HEENT: denies eye  complaints,denies sore throat, denies ear pain  RESPIRATORY: denies shortness of breath, denies cough  CARDIOVASCULAR: denies chest pain  GI: denies abdominal pain and denies heartburn  NEURO: denies headaches  Musculoskeletal: no joint pain, back pain    EXAM:   /78   Pulse 64   Ht 5' 6\" (1.676 m)   Wt 187 lb (84.8 kg)   LMP 01/01/2014   BMI 30.18 kg/m²   /76   Pulse 64   Ht 5' 6\" (1.676 m)   Wt 187 lb (84.8 kg)   LMP 01/01/2014   BMI 30.18 kg/m²     GENERAL: well developed, well nourished,in no apparent distress  LUNGS: clear to auscultation  CARDIO: RRR without murmur  EXTREMITIES: no cyanosis, clubbing or edema      ASSESSMENT AND PLAN:   1. Diabetes mellitus type 2 without retinopathy (HCC)  Well controlled with current medications. No changes.   - POC Glycohemoglobin [80007]  - CBC With Differential With Platelet; Future  - Comp Metabolic Panel (14); Future  - Lipid Panel; Future  - Microalb/Creat Ratio, Random Urine; Future  - TSH W Reflex To Free T4; Future  - Vitamin D; Future  - Vitamin B12 [E]; Future  - Hemoglobin A1C; Future    2. Vitamin D deficiency    - Vitamin D; Future          The patient indicates understanding of these issues and agrees to the plan.      Ria Bianchi MD  3/20/2024  10:03 AM

## 2024-04-19 NOTE — TELEPHONE ENCOUNTER
do you approve refill request? This medication is on pt med list but it has not been given by you in the past. Please advise

## 2024-06-27 NOTE — TELEPHONE ENCOUNTER
With  ID # [id number]    Action Requested: Summary for Provider     []  Critical Lab, Recommendations Needed  [] Need Additional Advice  [x]   FYI    []   Need Orders  [] Need Medications Sent to Pharmacy  []  Other     SUMMARY: Patient states she has been having rectal pain for 1 week due to constipation/hemorrhoid. Patient denies blood in stool Last bowel movement was today. Patient has tried metamucil, which helped, but then pain came back.     Patient was given care advice (please see care advice for advice given)  Patient was offered an office appointment, but declined at this time. Patient will try home care and call back if symptoms not improving.     Reason for call: Rectal Pain  Onset: 1 week    Reason for Disposition   Patient wants to be seen    Protocols used: Rectal Symptoms-A-OH

## 2024-07-17 NOTE — ED INITIAL ASSESSMENT (HPI)
Patient arrives ambulatory through triage with c/o of L. 3rd and 4th finger injury. Patient states her fingers got caught in a belt at work today around 1600. Fingers swollen, bruised in triage.

## 2024-07-17 NOTE — ED PROVIDER NOTES
Patient Seen in: [facility] Emergency Department    History     Chief Complaint   Patient presents with    Finger Injury       HPI    The patient presents to the ED complaining of an injury to her left third and fourth fingers after getting her fingers caught in a belt at work today.  She states pain is moderate in severity.  Denies other complaints.    History reviewed.   Past Medical History:    Diabetes (HCC)    Essential hypertension    Hyperlipidemia       History reviewed.   Past Surgical History:   Procedure Laterality Date    Colonoscopy  2018    Tubal ligation           Medications :  (Not in a hospital admission)       Family History   Problem Relation Age of Onset    Diabetes Father     Diabetes Mother     Diabetes Daughter     Hypertension Daughter     Diabetes Son     Hypertension Son     Breast Cancer Neg     Ovarian Cancer Neg     Glaucoma Neg     Macular degeneration Neg        Smoking Status:   Social History     Socioeconomic History    Marital status:    Tobacco Use    Smoking status: Never    Smokeless tobacco: Never   Vaping Use    Vaping status: Never Used   Substance and Sexual Activity    Alcohol use: Yes     Comment: social    Drug use: Never       Constitutional and vital signs reviewed.      Social History and Family History elements reviewed from today, pertinent positives to the presenting problem noted.    Physical Exam     ED Triage Vitals [07/16/24 2045]   BP (!) 169/89   Pulse 67   Resp 20   Temp 98.2 °F (36.8 °C)   Temp src    SpO2 99 %   O2 Device        All measures to prevent infection transmission during my interaction with the patient were taken. Handwashing was performed prior to and after the exam.  Stethoscope and any equipment used during my examination was cleaned with super sani-cloth germicidal wipes following the exam.     Physical Exam  Constitutional:       Appearance: Normal appearance.   Pulmonary:      Effort: Pulmonary effort is normal. No  respiratory distress.   Musculoskeletal:         General: Swelling and tenderness present. No deformity.      Comments: Bruising and swelling noted to the left third and fourth fingertips.  Fourth fingertip is more swollen and bruised on the third.  No obvious deformity.  Normal range of motion of the fingers   Neurological:      Mental Status: She is alert. Mental status is at baseline.   Psychiatric:         Mood and Affect: Mood normal.         Behavior: Behavior normal.         ED Course      Labs Reviewed - No data to display    As Interpreted by me    Imaging Results Available and Reviewed while in ED: XR HAND (MIN 3 VIEWS), LEFT (CPT=73130)    Result Date: 7/16/2024  CONCLUSION:  1.  Acute fracture involving the tip/tuft of the left 4th distal phalanx. 2.  Punctate calcification along the volar margin of the third distal interphalangeal joint which could represent foreign body/debris in or along the patient's skin.  It could also represent old injury and correlate for flexor weakness.  Dictated by (CST): Hi Taylor MD on 7/16/2024 at 9:51 PM     Finalized by (CST): Hi Taylor MD on 7/16/2024 at 9:52 PM         ED Medications Administered:   Medications   HYDROcodone-acetaminophen (Norco) 5-325 MG per tab 2 tablet (2 tablets Oral Given 7/16/24 2207)         MDM     Vitals:    07/16/24 2045 07/16/24 2200   BP: (!) 169/89 126/72   Pulse: 67 67   Resp: 20    Temp: 98.2 °F (36.8 °C)    SpO2: 99% 96%     *I personally reviewed and interpreted all ED vitals.    Pulse Ox: 96%, Room air, Normal     Differential Diagnosis/ Diagnostic Considerations: Fingertip crush injuries, fracture, bruising, other    Complicating Factors: The patient already has does not have any pertinent problems on file. to contribute to the complexity of this ED evaluation.    Medical Decision Making  The patient presents to the ED with isolated injuries to her left third and fourth fingertips.  No injury to the nails.  Fracture noted to the  distal phalanx of the fourth finger.  Patient placed in a finger splint and stable for discharge with outpatient follow-up.        Problems Addressed:  Closed nondisplaced fracture of distal phalanx of left ring finger, initial encounter: acute illness or injury  Contusion of left middle finger without damage to nail, initial encounter: acute illness or injury    Amount and/or Complexity of Data Reviewed  Radiology: ordered and independent interpretation performed. Decision-making details documented in ED Course.     Details: I personally reviewed the patient's left hand x-ray images and noted a tuft fracture of the left fourth finger        Condition upon leaving the department: Stable    Disposition and Plan     Clinical Impression:  1. Closed nondisplaced fracture of distal phalanx of left ring finger, initial encounter    2. Contusion of left middle finger without damage to nail, initial encounter        Disposition:  Discharge    Follow-up:  Ria Bianchi MD  [address] [place] [id number]  [phone]    Schedule an appointment as soon as possible for a visit in 3 day(s)      [facility]  [address] [facility] [address]  [phone]  Schedule an appointment as soon as possible for a visit in 2 day(s)        Medications Prescribed:  Discharge Medication List as of 7/16/2024 10:11 PM

## 2024-08-14 NOTE — PROGRESS NOTES
Reviewed at appointment. Diabetes is improving - good job. Cholesterol better controlled also. Continue current medications the same.

## 2024-08-14 NOTE — PROGRESS NOTES
HPI:   Trinity Smith is a 59 year old female who presents for a complete physical exam.    07/01 - internal hemorroids, 07/16- left ringer finger tendon injury and fracture- occurred at work, home /72  Taking her DM medications. Trying to stay active but less since fracture.     Wt Readings from Last 3 Encounters:   08/14/24 193 lb (87.5 kg)   03/20/24 187 lb (84.8 kg)   08/05/23 191 lb 3.2 oz (86.7 kg)     Body mass index is 31.15 kg/m².       Current Outpatient Medications   Medication Sig Dispense Refill    JARDIANCE 10 MG Oral Tab       dapagliflozin (FARXIGA) 10 MG Oral Tab Take 1 tablet (10 mg total) by mouth daily. 90 tablet 4    ergocalciferol 1.25 MG (84036 UT) Oral Cap TAKE 1 CAPSULE BY MOUTH EVERY WEEK 12 capsule 3    Fenofibrate 160 MG Oral Tab TAKE 1 TABLET BY MOUTH EVERY DAY 90 tablet 4    hydroCHLOROthiazide 25 MG Oral Tab Take 1 tablet (25 mg total) by mouth daily. 90 tablet 4    metFORMIN HCl 1000 MG Oral Tab TAKE 1 TABLET BY MOUTH TWICE DAILY AFTER MEALS 180 tablet 4    metoprolol succinate ER 50 MG Oral Tablet 24 Hr Take 1 tablet (50 mg total) by mouth daily. 90 tablet 4    telmisartan 80 MG Oral Tab Take 1 tablet (80 mg total) by mouth daily. 90 tablet 4    Coenzyme Q10 100 MG Oral Cap  30 capsule 0    Blood Glucose Monitoring Suppl Does not apply Kit Glucose meter - Contour or one covered by insurance. 100 lancets and strips with 5 refills. Check glucose daily 1 kit 0    Lancets Does not apply Misc Check glucose daily 100 each 4    Glucose Blood (ONETOUCH ULTRA) In Vitro Strip Check glucose daily. 100 each 4    Blood Glucose Monitoring Suppl Does not apply Kit Glucose meter - covered by insurance. 100 lancets and strips with 5 refills. Check glucose daily 1 kit 0      Past Medical History:    Diabetes (HCC)    Essential hypertension    Hyperlipidemia      Past Surgical History:   Procedure Laterality Date    Colonoscopy  2018    Tubal ligation        Family History   Problem  Relation Age of Onset    Diabetes Father     Diabetes Mother     Diabetes Daughter     Hypertension Daughter     Diabetes Son     Hypertension Son     Breast Cancer Neg     Ovarian Cancer Neg     Glaucoma Neg     Macular degeneration Neg       Social History:   Social History     Socioeconomic History    Marital status:    Tobacco Use    Smoking status: Never    Smokeless tobacco: Never   Vaping Use    Vaping status: Never Used   Substance and Sexual Activity    Alcohol use: Yes     Comment: social    Drug use: Never          REVIEW OF SYSTEMS:   GENERAL: feels well otherwise  Review of Systems   EXAM:   /81   Pulse 66   Ht 5' 6\" (1.676 m)   Wt 193 lb (87.5 kg)   LMP 01/01/2014   BMI 31.15 kg/m²     GENERAL: well developed, well nourished,in no apparent distress  SKIN: no rashes,no suspicious lesions  HEENT: atraumatic, normocephalic,ears and throat are clear  EYES:PERRLA, EOMI, conjunctiva are clear  LUNGS: clear to auscultation  CARDIO: RRR without murmur  GI: good BS's,no masses, HSM or tenderness  EXTREMITIES: no cyanosis, clubbing or edema  NEURO: Oriented times three,cranial nerves are intact,motor and sensory are grossly intact  Bilateral barefoot skin diabetic exam is normal, visualized feet and the appearance is normal.  Bilateral monofilament/sensation of both feet is normal.  Pulsation pedal pulse exam of both lower legs/feet is normal as well.     ASSESSMENT AND PLAN:   Trinity Smith is a 59 year old female who presents for a complete physical exam.    1. Diabetes mellitus type 2 without retinopathy (HCC)  Well controlled.     2. Routine medical exam      3. Essential hypertension  Stable on meds       Ria Bianchi MD  8/14/2024  10:51 AM

## 2025-02-15 NOTE — PROGRESS NOTES
Trinity Smith is a 59 year old female.   Chief Complaint   Patient presents with    Diabetes     6 month f/u      Vaginal Problem     Itching and discharge     HPI:   Recent covid - diagnosed 5 days ago. Reports hitting her hard. Been tired.   Reports being treated for yeast infection in October. Reports when wiping she has some pain and some discharge.   A1C today 6.7. has not been taking farxiga for about 3 weeks.   Medications Ordered Prior to Encounter[1]   Past Medical History:    Diabetes (HCC)    Essential hypertension    Hyperlipidemia      Social History:  Social History     Socioeconomic History    Marital status:    Tobacco Use    Smoking status: Never    Smokeless tobacco: Never   Vaping Use    Vaping status: Never Used   Substance and Sexual Activity    Alcohol use: Yes     Comment: social    Drug use: Never        REVIEW OF SYSTEMS:   Review of Systems   See HPI     EXAM:   /81   Pulse 75   Ht 5' 6\" (1.676 m)   Wt 189 lb (85.7 kg)   LMP 01/01/2014   BMI 30.51 kg/m²   Blood pressure 136/78, pulse 75, height 5' 6\" (1.676 m), weight 189 lb (85.7 kg), last menstrual period 01/01/2014, not currently breastfeeding.    GENERAL: well developed, well nourished,in no apparent distress  LUNGS: clear to auscultation  CARDIO: RRR without murmur  : labia swollen bilaterally mild erythema, thin white vaginal discharge.     ASSESSMENT AND PLAN:   1. Diabetes mellitus type 2 without retinopathy (HCC)  Well controlled. Pt reports taking jardiance and farxiga right now - will call pharmacy to clarify = should only have one of those.   - POC Glycohemoglobin [80100]  - Comp Metabolic Panel (14); Future  - CBC With Differential With Platelet; Future  - Lipid Panel; Future  - Microalb/Creat Ratio, Random Urine; Future    2. COVID  Supportive care     3. Screening mammogram for breast cancer    - [facility] MAHAMED 2D+3D SCREENING BILAT (CPT=77067/07835); Future    4. Acute vaginitis  Sending culture. Tx  with diflucan daily for 7 days as significant inflammation.       The patient indicates understanding of these issues and agrees to the plan.      Ria Bianchi MD  2/15/2025  8:36 AM         [1]   Current Outpatient Medications on File Prior to Visit   Medication Sig Dispense Refill    JARDIANCE 10 MG Oral Tab       dapagliflozin (FARXIGA) 10 MG Oral Tab Take 1 tablet (10 mg total) by mouth daily. 90 tablet 4    ergocalciferol 1.25 MG (34948 UT) Oral Cap TAKE 1 CAPSULE BY MOUTH EVERY WEEK 12 capsule 3    Fenofibrate 160 MG Oral Tab TAKE 1 TABLET BY MOUTH EVERY DAY 90 tablet 4    hydroCHLOROthiazide 25 MG Oral Tab Take 1 tablet (25 mg total) by mouth daily. 90 tablet 4    metFORMIN HCl 1000 MG Oral Tab TAKE 1 TABLET BY MOUTH TWICE DAILY AFTER MEALS 180 tablet 4    metoprolol succinate ER 50 MG Oral Tablet 24 Hr Take 1 tablet (50 mg total) by mouth daily. 90 tablet 4    telmisartan 80 MG Oral Tab Take 1 tablet (80 mg total) by mouth daily. 90 tablet 4    Coenzyme Q10 100 MG Oral Cap  30 capsule 0    Blood Glucose Monitoring Suppl Does not apply Kit Glucose meter - Contour or one covered by insurance. 100 lancets and strips with 5 refills. Check glucose daily 1 kit 0    Lancets Does not apply Misc Check glucose daily 100 each 4    Glucose Blood (ONETOUCH ULTRA) In Vitro Strip Check glucose daily. 100 each 4    Blood Glucose Monitoring Suppl Does not apply Kit Glucose meter - covered by insurance. 100 lancets and strips with 5 refills. Check glucose daily 1 kit 0     No current facility-administered medications on file prior to visit.

## 2025-02-17 NOTE — PROGRESS NOTES
Culture was negative. Can stop the diflucan and use in future if yeast infection returns. - Dr. Bianchi

## 2025-02-23 NOTE — PROGRESS NOTES
Hi Trinity - Your cholesterol is increasing -make sure you are taking fenofibrate every day. Your white blood cells and platelets are elevated right now. May be from recent infection. Will recheck in 1 month. I placed the order. Kidney and liver function are stable. - Dr. Bianchi

## 2025-02-24 NOTE — TELEPHONE ENCOUNTER
Ok will send to Dr. Clovis baker for evaluation. May be related to farxiga but also just started estrogen cream so will take a few weeks to improve symptoms

## 2025-02-26 NOTE — PROGRESS NOTES
[facility]  Obstetrics and Gynecology  Gyne Problem Visit      Trinity Smith is a 59 year old female  is a new patient to me presenting with concerns of vaginal itching and dryness for the last 3 months. Feels dryness and irritation on inner labia. Notes occasional thin white vaginal discharge without odor. No associated urinary symptoms. Only notes irritation with wiping. Sexually active with same partner. She was given estradiol cream by her PCP, Recent negative vaginosis culture. She has been using cream since 2/15 with little to no relief. She applies cream nightly for 1 week, has not used it this week. No recent antibiotic use.     Patient's last menstrual period was 2014.     Pap:   Contraception:tubal ligation    OBSTETRICS HISTORY:  OB History    Para Term  AB Living   3 3 3 0 0 3   SAB IAB Ectopic Multiple Live Births   0 0 0 0 0       GYNE HISTORY:  Hx Prior Abnormal Pap: No   Menarche: 14-16y/o (2025  1:45 PM)  Use of Birth Control (if yes, specify type): Postmenopausal (2025  1:45 PM)  Hx Prior Abnormal Pap: No (2025  1:45 PM)        Latest Ref Rng & Units 2023    10:27 AM 2021     9:58 AM   RECENT PAP RESULTS   INTERPRETATION/RESULT: Negative for intraepithelial lesion or malignancy Negative for intraepithelial lesion or malignancy  Atypical squamous cells of undetermined significance (ASC-US)    HPV Negative Negative  Negative          History   Sexual Activity    Sexual activity: Not on file       MEDICAL HISTORY:  Past Medical History:   Diagnosis Date    Diabetes (HCC)     Essential hypertension     Hyperlipidemia      Past Surgical History:   Procedure Laterality Date    Colonoscopy  2018    Tubal ligation         SOCIAL HISTORY:  Social History     Socioeconomic History    Marital status:      Spouse name: Not on file    Number of children: Not on file    Years of education: Not on file    Highest education level: Not on file    Occupational History    Not on file   Tobacco Use    Smoking status: Never    Smokeless tobacco: Never   Vaping Use    Vaping status: Never Used   Substance and Sexual Activity    Alcohol use: Yes     Comment: social    Drug use: Never    Sexual activity: Not on file   Other Topics Concern    Not on file   Social History Narrative    Not on file     Social Drivers of Health     Food Insecurity: Not on file   Transportation Needs: Not on file   Stress: Not on file   Housing Stability: Not on file       MEDICATIONS:    Current Outpatient Medications:     estradiol 0.1 MG/GM Vaginal Cream, Place 1 g vaginally daily. For 1 week then twice a week, Disp: 42 g, Rfl: 11    dapagliflozin (FARXIGA) 10 MG Oral Tab, Take 1 tablet (10 mg total) by mouth daily., Disp: 90 tablet, Rfl: 4    ergocalciferol 1.25 MG (31466 UT) Oral Cap, TAKE 1 CAPSULE BY MOUTH EVERY WEEK, Disp: 12 capsule, Rfl: 3    Fenofibrate 160 MG Oral Tab, TAKE 1 TABLET BY MOUTH EVERY DAY, Disp: 90 tablet, Rfl: 4    hydroCHLOROthiazide 25 MG Oral Tab, Take 1 tablet (25 mg total) by mouth daily., Disp: 90 tablet, Rfl: 4    metFORMIN HCl 1000 MG Oral Tab, TAKE 1 TABLET BY MOUTH TWICE DAILY AFTER MEALS, Disp: 180 tablet, Rfl: 4    metoprolol succinate ER 50 MG Oral Tablet 24 Hr, Take 1 tablet (50 mg total) by mouth daily., Disp: 90 tablet, Rfl: 4    telmisartan 80 MG Oral Tab, Take 1 tablet (80 mg total) by mouth daily., Disp: 90 tablet, Rfl: 4    Lancets Does not apply Misc, Check glucose daily, Disp: 100 each, Rfl: 4    Coenzyme Q10 100 MG Oral Cap, , Disp: 30 capsule, Rfl: 0    fluconazole (DIFLUCAN) 150 MG Oral Tab, Take 1 tablet (150 mg total) by mouth daily. (Patient not taking: Reported on 2/26/2025), Disp: 7 tablet, Rfl: 0    JARDIANCE 10 MG Oral Tab, , Disp: , Rfl:     Blood Glucose Monitoring Suppl Does not apply Kit, Glucose meter - Contour or one covered by insurance. 100 lancets and strips with 5 refills. Check glucose daily (Patient not taking:  Reported on 2/26/2025), Disp: 1 kit, Rfl: 0    Glucose Blood (ONETOUCH ULTRA) In Vitro Strip, Check glucose daily. (Patient not taking: Reported on 2/26/2025), Disp: 100 each, Rfl: 4    Blood Glucose Monitoring Suppl Does not apply Kit, Glucose meter - covered by insurance. 100 lancets and strips with 5 refills. Check glucose daily (Patient not taking: Reported on 2/26/2025), Disp: 1 kit, Rfl: 0    ALLERGIES:  Allergies[1]      REVIEW OF SYSTEMS:  Review of Systems   Constitutional:  Negative for chills, fever and unexpected weight change.   Respiratory: Negative.     Cardiovascular: Negative.    Gastrointestinal:  Negative for abdominal pain, constipation, diarrhea and nausea.   Genitourinary:  Positive for vaginal pain (irritation). Negative for dyspareunia, dysuria, genital sores, hematuria, menstrual problem, pelvic pain, vaginal bleeding and vaginal discharge.   Musculoskeletal: Negative.    Skin: Negative.    Neurological: Negative.    Hematological: Negative.    Psychiatric/Behavioral: Negative.         PHYSICAL EXAM:  /75   Pulse 80   Wt 192 lb (87.1 kg)   LMP 01/01/2014   BMI 30.99 kg/m²     GENERAL: well developed, well nourished, in no apparent distress  ABDOMEN: Soft, non distended; non tender, no masses  GYNE/: External Genitalia: Erythematous, mildly edematous labia minora no hypopigmentation or lesions. Urethral meatus appear wnl, no abnormal discharge or lesions noted.          Bladder: well supported, urethra wnl, no lesions or fissures                     Vagina: normal pink mucosa, no lesions, normal clear discharge.                         ASSESSMENT:       ICD-10-CM    1. Genitourinary syndrome of menopause  N95.8           Plan:  - Will have patient apply topical estrogen cream to internal labia nightly for the next two weeks, then twice weekly for another two weeks. Vaginal hygiene also discussed. To utilized OTC vaginal lubricants such as Replens or Liquid Beads. RTC in 4 weeks  for follow-up.      RODRIGUE WELCH PA-C  1:49 PM  2/26/2025        Spent total time 20 minutes on obtaining history / chart review, evaluating patient / performing medically appropriate exam, discussing treatment options, counseling / educating, and completing documentation, coordinating care.         [1]   Allergies  Allergen Reactions    Atorvastatin SWELLING    Lisinopril Coughing

## 2025-04-02 NOTE — PROGRESS NOTES
[facility]  Obstetrics and Gynecology  Gyne Problem Visit    Trinity Smith is a 59 year old female  presenting for follow-up after using premarin cream for the last month. Also tried course of Replens. Still presenting with irritation and itching. Not associated with abnormal discharge. Requesting STD screening today.     Patient's last menstrual period was 2014.     Pap:   Contraception:postmenopause    OBSTETRICS HISTORY:  OB History    Para Term  AB Living   3 3 3 0 0 3   SAB IAB Ectopic Multiple Live Births   0 0 0 0 0       GYNE HISTORY:      Menarche: 14-16y/o (2025  1:45 PM)  Use of Birth Control (if yes, specify type): Postmenopausal (2025  1:45 PM)  Hx Prior Abnormal Pap: No (2025  1:45 PM)        Latest Ref Rng & Units 2023    10:27 AM 2021     9:58 AM   RECENT PAP RESULTS   INTERPRETATION/RESULT: Negative for intraepithelial lesion or malignancy Negative for intraepithelial lesion or malignancy  Atypical squamous cells of undetermined significance (ASC-US)    HPV Negative Negative  Negative          History   Sexual Activity    Sexual activity: Not on file       MEDICAL HISTORY:  Past Medical History:   Diagnosis Date    Diabetes (HCC)     Essential hypertension     Hyperlipidemia      Past Surgical History:   Procedure Laterality Date    Colonoscopy  2018    Tubal ligation         SOCIAL HISTORY:  Social History     Socioeconomic History    Marital status:      Spouse name: Not on file    Number of children: Not on file    Years of education: Not on file    Highest education level: Not on file   Occupational History    Not on file   Tobacco Use    Smoking status: Never    Smokeless tobacco: Never   Vaping Use    Vaping status: Never Used   Substance and Sexual Activity    Alcohol use: Yes     Comment: social    Drug use: Never    Sexual activity: Not on file   Other Topics Concern    Not on file   Social History Narrative    Not on file      Social Drivers of Health     Food Insecurity: Not on file   Transportation Needs: Not on file   Stress: Not on file   Housing Stability: Not on file       MEDICATIONS:    Current Outpatient Medications:     estradiol 0.1 MG/GM Vaginal Cream, Place 1 g vaginally daily. For 1 week then twice a week, Disp: 42 g, Rfl: 11    JARDIANCE 10 MG Oral Tab, , Disp: , Rfl:     dapagliflozin (FARXIGA) 10 MG Oral Tab, Take 1 tablet (10 mg total) by mouth daily., Disp: 90 tablet, Rfl: 4    ergocalciferol 1.25 MG (58866 UT) Oral Cap, TAKE 1 CAPSULE BY MOUTH EVERY WEEK, Disp: 12 capsule, Rfl: 3    Fenofibrate 160 MG Oral Tab, TAKE 1 TABLET BY MOUTH EVERY DAY, Disp: 90 tablet, Rfl: 4    hydroCHLOROthiazide 25 MG Oral Tab, Take 1 tablet (25 mg total) by mouth daily., Disp: 90 tablet, Rfl: 4    metFORMIN HCl 1000 MG Oral Tab, TAKE 1 TABLET BY MOUTH TWICE DAILY AFTER MEALS, Disp: 180 tablet, Rfl: 4    metoprolol succinate ER 50 MG Oral Tablet 24 Hr, Take 1 tablet (50 mg total) by mouth daily., Disp: 90 tablet, Rfl: 4    telmisartan 80 MG Oral Tab, Take 1 tablet (80 mg total) by mouth daily., Disp: 90 tablet, Rfl: 4    Glucose Blood (ONETOUCH ULTRA) In Vitro Strip, Check glucose daily., Disp: 100 each, Rfl: 4    Coenzyme Q10 100 MG Oral Cap, , Disp: 30 capsule, Rfl: 0    fluconazole (DIFLUCAN) 150 MG Oral Tab, Take 1 tablet (150 mg total) by mouth daily. (Patient not taking: Reported on 4/2/2025), Disp: 7 tablet, Rfl: 0    Blood Glucose Monitoring Suppl Does not apply Kit, Glucose meter - Contour or one covered by insurance. 100 lancets and strips with 5 refills. Check glucose daily (Patient not taking: Reported on 4/2/2025), Disp: 1 kit, Rfl: 0    Lancets Does not apply Misc, Check glucose daily (Patient not taking: Reported on 4/2/2025), Disp: 100 each, Rfl: 4    Blood Glucose Monitoring Suppl Does not apply Kit, Glucose meter - covered by insurance. 100 lancets and strips with 5 refills. Check glucose daily (Patient not taking:  Reported on 4/2/2025), Disp: 1 kit, Rfl: 0    ALLERGIES:  Allergies[1]      REVIEW OF SYSTEMS:  Review of Systems   Constitutional:  Negative for chills, fever and unexpected weight change.   Respiratory: Negative.     Cardiovascular: Negative.    Gastrointestinal:  Negative for abdominal pain, constipation, diarrhea and nausea.   Genitourinary:  Negative for dyspareunia, dysuria, genital sores, hematuria, menstrual problem, pelvic pain, vaginal bleeding, vaginal discharge and vaginal pain.        Vaginal irritation   Musculoskeletal: Negative.    Skin: Negative.    Neurological: Negative.    Hematological: Negative.    Psychiatric/Behavioral: Negative.         PHYSICAL EXAM:  /90   Pulse 69   Wt 191 lb (86.6 kg)   LMP 01/01/2014   BMI 30.83 kg/m²     Chaperone offered, pt declined.    GENERAL: well developed, well nourished, in no apparent distress  ABDOMEN: Soft, non distended; non tender, no masses  GYNE/: External Genitalia: throughout bilateral labia skin appears hypopigmented with skin breakdown. Urethral meatus appear wnl, no abnormal discharge or lesions noted.          Bladder: well supported, urethra wnl, no lesions or fissures                     Vagina: normal pink mucosa, no lesions, normal clear discharge.                      Uterus: mobile, non tender, normal size                     Cervix: Normal                      Adnexa: non tender, no masses, normal size    Procedure Performed:  Vulvar Biopsy    Indications:  vaginitis, hypopigmentation    Risks, benefits, alternatives, and indications of procedure reviewed with patient.  The patient voiced clear understanding and desired to proceed.  Consent for a vulvar punch biopsy was signed by patient.    The vulva was then prepped with Betadine.  Anesthesia was performed with an injection of 1% Lidocaine.  A 3 mm punch biopsy was placed on the left posterior labia.  The biopsy tissue was elevated with forceps and excised using a scapel.   Hemostatsis was achieved using silver nitrate.  EBL was 1 mL.    Patient tolerated the procedure well.  There were no complications.  Patient instructed to keep area clean with soap and water, apply topical bacitracin twice a day. Call if fever, increased pain, swelling or redness. May use tylenol or ibuprofen prn pain. May also apply ice to area and sitz baths explained.    The biopsy specimen was sent to pathology       ASSESSMENT:       ICD-10-CM    1. Screen for STD (sexually transmitted disease)  Z11.3 Chlamydia/GC PCR Combo     Trichomonas vaginalis, ZAINAB (Vaginal/Cervical)      2. Vaginitis and vulvovaginitis  N76.0 Specimen to Pathology, Tissue     BIOPSY VULVA/PERINEUM,ONE LESN     lidocaine 2%-EPINEPHrine 1:100,000 (Xylocaine-Epinephrine) injection      3. Chronic vulvitis  N76.3 Specimen to Pathology, Tissue     BIOPSY VULVA/PERINEUM,ONE LESN     lidocaine 2%-EPINEPHrine 1:100,000 (Xylocaine-Epinephrine) injection          Plan:  - STD screening and vulvar biopsy collected, ddx including lichen sclerosus vs simplex vs. Infection. Will await final pathology result prior to further recommendations. Pt may apply vaseline externally in the interim. We discussed the probability of topical steroid cream for the next 12 weeks. Pt in agreement with plan.      Requested Prescriptions      No prescriptions requested or ordered in this encounter       RODRIGUE WELCH PA-C  7:47 AM  4/2/2025      Spent total time 20 minutes on obtaining history / chart review, evaluating patient / performing medically appropriate exam, discussing treatment options, counseling / educating, and completing documentation, coordinating care.         [1]   Allergies  Allergen Reactions    Atorvastatin SWELLING    Lisinopril Coughing

## 2025-04-02 NOTE — PROGRESS NOTES
Improved white blood cell count and platelets. No further work up needed. - Dr. Bianchi  Relevant Problems   No relevant active problems       Anesthetic History   No history of anesthetic complications            Review of Systems / Medical History  Patient summary reviewed and pertinent labs reviewed    Pulmonary  Within defined limits                 Neuro/Psych         Psychiatric history     Cardiovascular  Within defined limits                     GI/Hepatic/Renal  Within defined limits              Endo/Other      Hypothyroidism: well controlled       Other Findings              Physical Exam    Airway  Mallampati: II  TM Distance: 4 - 6 cm  Neck ROM: normal range of motion   Mouth opening: Normal     Cardiovascular               Dental  No notable dental hx       Pulmonary                 Abdominal  GI exam deferred       Other Findings            Anesthetic Plan    ASA: 2  Anesthesia type: MAC            Anesthetic plan and risks discussed with: Patient

## 2025-04-07 NOTE — TELEPHONE ENCOUNTER
Called patient to discuss vulvar biopsy results. LMTCB    Biopsy was consistent with lichen simplex, a condition caused by chronic inflammation from itching. As per our last visit, will discuss if she is interested in starting topical steroid cream.

## 2025-04-07 NOTE — TELEPHONE ENCOUNTER
Previous telephone encounter of 4/07 Patient returned call to discuss results. Please call with .

## 2025-05-14 NOTE — PROGRESS NOTES
[facility]  Obstetrics and Gynecology  Gyne Problem Visit      Trinity Smith is a 59 year old female  presenting for follow-up. Noted to have lichen simplex from vulvar biopsy done last month. She has completed topical steroid treatment with little to no improvement. She finds some relief with application of Vaseline. She denies any abnormal vaginal discharge, odor, irritation, or itching.     Patient's last menstrual period was 2014.     Pap:   Contraception:postmenopause    OBSTETRICS HISTORY:  OB History    Para Term  AB Living   3 3 3 0 0 3   SAB IAB Ectopic Multiple Live Births   0 0 0 0 0       GYNE HISTORY:      Menarche: 14-16y/o (2025  1:45 PM)  Use of Birth Control (if yes, specify type): Postmenopausal (2025  1:45 PM)  Hx Prior Abnormal Pap: No (2025  1:45 PM)        Latest Ref Rng & Units 2023    10:27 AM 2021     9:58 AM   RECENT PAP RESULTS   INTERPRETATION/RESULT: Negative for intraepithelial lesion or malignancy Negative for intraepithelial lesion or malignancy  Atypical squamous cells of undetermined significance (ASC-US)    HPV Negative Negative  Negative          History   Sexual Activity    Sexual activity: Not on file       MEDICAL HISTORY:  Past Medical History[1]  Past Surgical History[2]    SOCIAL HISTORY:  Social History     Socioeconomic History    Marital status:      Spouse name: Not on file    Number of children: Not on file    Years of education: Not on file    Highest education level: Not on file   Occupational History    Not on file   Tobacco Use    Smoking status: Never    Smokeless tobacco: Never   Vaping Use    Vaping status: Never Used   Substance and Sexual Activity    Alcohol use: Yes     Comment: social    Drug use: Never    Sexual activity: Not on file   Other Topics Concern    Not on file   Social History Narrative    Not on file     Social Drivers of Health     Food Insecurity: Not on file   Transportation  Needs: Not on file   Stress: Not on file   Housing Stability: Not on file       MEDICATIONS:  Medications - Current[3]    ALLERGIES:  Allergies[4]      REVIEW OF SYSTEMS:  Review of Systems   Constitutional:  Negative for chills, fever and unexpected weight change.   Respiratory: Negative.     Cardiovascular: Negative.    Gastrointestinal:  Negative for abdominal pain, constipation, diarrhea and nausea.   Genitourinary:  Negative for dyspareunia, dysuria, genital sores, hematuria, menstrual problem, pelvic pain, vaginal bleeding, vaginal discharge and vaginal pain.        Vulvar itching   Musculoskeletal: Negative.    Skin: Negative.    Neurological: Negative.    Hematological: Negative.    Psychiatric/Behavioral: Negative.         PHYSICAL EXAM:  /69   Pulse 67   Wt 199 lb (90.3 kg)   LMP 01/01/2014   BMI 32.12 kg/m²     Chaperone offered, pt decline.    GENERAL: well developed, well nourished, in no apparent distress  ABDOMEN: Soft, non distended; non tender, no masses  GYNE/: External Genitalia: Along border of bilateral labia majora skin is thin and hypopigmented with areas of excoriation. Urethral meatus appear wnl, no abnormal discharge or lesions noted.      ASSESSMENT:       ICD-10-CM    1. Lichen simplex chronicus  L28.0           Plan:  - Will trial topical doxepin. Discussed possible side effects of drowsiness. To use for no more than 7 days.  - Continue applying Vaseline and warm soaks. Vaginal hygiene discussed.      Requested Prescriptions     Signed Prescriptions Disp Refills    Doxepin HCl 5 % External Cream 30 g 0     Sig: Apply 1 Application topically 4 (four) times daily for 7 days. Wait 3-4 hours in between treatments       RODRIGUE WELCH PA-C  8:00 AM  5/14/2025        Spent total time 20 minutes on obtaining history / chart review, evaluating patient / performing medically appropriate exam, discussing treatment options, counseling / educating, and completing documentation,  coordinating care.           [1]   Past Medical History:  Diagnosis Date    Diabetes (HCC)     Essential hypertension     Hyperlipidemia    [2]   Past Surgical History:  Procedure Laterality Date    Colonoscopy  2018    Tubal ligation     [3]   Current Outpatient Medications:     Doxepin HCl 5 % External Cream, Apply 1 Application topically 4 (four) times daily for 7 days. Wait 3-4 hours in between treatments, Disp: 30 g, Rfl: 0    JARDIANCE 10 MG Oral Tab, , Disp: , Rfl:     dapagliflozin (FARXIGA) 10 MG Oral Tab, Take 1 tablet (10 mg total) by mouth daily., Disp: 90 tablet, Rfl: 4    ergocalciferol 1.25 MG (36299 UT) Oral Cap, TAKE 1 CAPSULE BY MOUTH EVERY WEEK, Disp: 12 capsule, Rfl: 3    Fenofibrate 160 MG Oral Tab, TAKE 1 TABLET BY MOUTH EVERY DAY, Disp: 90 tablet, Rfl: 4    hydroCHLOROthiazide 25 MG Oral Tab, Take 1 tablet (25 mg total) by mouth daily., Disp: 90 tablet, Rfl: 4    metFORMIN HCl 1000 MG Oral Tab, TAKE 1 TABLET BY MOUTH TWICE DAILY AFTER MEALS, Disp: 180 tablet, Rfl: 4    metoprolol succinate ER 50 MG Oral Tablet 24 Hr, Take 1 tablet (50 mg total) by mouth daily., Disp: 90 tablet, Rfl: 4    telmisartan 80 MG Oral Tab, Take 1 tablet (80 mg total) by mouth daily., Disp: 90 tablet, Rfl: 4    Coenzyme Q10 100 MG Oral Cap, , Disp: 30 capsule, Rfl: 0    fluconazole (DIFLUCAN) 150 MG Oral Tab, Take 1 tablet (150 mg total) by mouth daily. (Patient not taking: Reported on 4/2/2025), Disp: 7 tablet, Rfl: 0    estradiol 0.1 MG/GM Vaginal Cream, Place 1 g vaginally daily. For 1 week then twice a week (Patient not taking: Reported on 5/14/2025), Disp: 42 g, Rfl: 11    Blood Glucose Monitoring Suppl Does not apply Kit, Glucose meter - Contour or one covered by insurance. 100 lancets and strips with 5 refills. Check glucose daily (Patient not taking: Reported on 5/14/2025), Disp: 1 kit, Rfl: 0    Lancets Does not apply Misc, Check glucose daily (Patient not taking: Reported on 5/14/2025), Disp: 100 each, Rfl:  4    Glucose Blood (ONETOUCH ULTRA) In Vitro Strip, Check glucose daily. (Patient not taking: Reported on 5/14/2025), Disp: 100 each, Rfl: 4    Blood Glucose Monitoring Suppl Does not apply Kit, Glucose meter - covered by insurance. 100 lancets and strips with 5 refills. Check glucose daily (Patient not taking: Reported on 5/14/2025), Disp: 1 kit, Rfl: 0  [4]   Allergies  Allergen Reactions    Atorvastatin SWELLING    Lisinopril Coughing

## 2025-07-26 NOTE — TELEPHONE ENCOUNTER
Last Vit D done 8-10-24.  pls advise, thanks in advance.       Refill Passed Per Protocol    Requested Prescriptions   Pending Prescriptions Disp Refills    METFORMIN HCL 1000 MG Oral Tab [Pharmacy Med Name: METFORMIN 1000MG TABLETS] 180 tablet 4     Sig: TAKE 1 TABLET BY MOUTH TWICE DAILY AFTER MEALS       Diabetes Medication Protocol Passed - 7/26/2025 12:32 AM        Passed - Last A1C < 7.5 and within past 6 months     Lab Results   Component Value Date    A1C 6.7 (A) 02/15/2025             Passed - In person appointment or virtual visit in the past 6 mos or appointment in next 3 mos     Recent Outpatient Visits              2 months ago Lichen simplex chronicus    [facility] - OB/GYN Miguel Sepulveda PA-C    Office Visit    3 months ago Screen for STD (sexually transmitted disease)    [facility] - OB/GYN Miguel Sepulveda PA-C    Office Visit    5 months ago Genitourinary syndrome of menopause    [facility] OB/Miguel Lance PA-C    Office Visit    5 months ago Diabetes mellitus type 2 [facility] retinopathy [facility]CC[facility]    [facility]    11 months ago Diabetes mellitus type 2 without retinopathy (HCC)    [facility]    [facility]          Future Appointments         Provider Department Appt Notes    In 2 weeks ADO DEXA RM1; ADO DEDRICK RM1 [facility]     In 3 weeks Ria Bianchi MD [facility] Return in about 6 months (around 8/15/2025) for physical and diabetes check .                    Passed - Microalbumin procedure in past 12 months or taking ACE/ARB        Passed - EGFRCR or GFRNAA > 50     GFR Evaluation  EGFRCR: 67 , resulted on 2/15/2025           Passed - GFR in the past 12 months        Passed - Medication is active on med list          ERGOCALCIFEROL 1.25 MG (63628 UT) Oral Cap [Pharmacy Med Name: VITAMIN D2 50,000IU (ERGO) CAP RX] 12 capsule 3     Sig: TAKE 1 CAPSULE BY MOUTH EVERY WEEK       There is no refill protocol information for this order          Future Appointments         Provider Department Appt Notes    In 2 weeks ADO DEXA RM1; ADO DEDRICK RM1 [facility] [place]     In 3 weeks Ria Bianchi MD [facility] Return in about 6 months (around 8/15/2025) for physical and diabetes check .          Recent Outpatient Visits              2 months ago Lichen simplex chronicus    [facility] - OB/GYN Miguel Sepulveda PA-C    Office Visit    3 months ago [facility] STD [facility]ually [facility] disease)    [facility]    Office Visit    5 months ago Genitourinary syndrome of menopause    [facility] [place] - OB/Miguel Lance PA-C    Office Visit    5 months ago Diabetes mellitus type 2 without retinopathy (HCC)    [facility]    Office Visit    11 months ago Diabetes mellitus type 2 without retinopathy (HCC)    [facility]    Office Visit

## (undated) NOTE — LETTER
09/09/21    Lawence Yogesh  [address]      Dear Karli Bashir records indicate that you have outstanding lab work and or testing that was ordered for you and has not yet been completed:  Orders Placed This Encounter      CBC Wit

## (undated) NOTE — LETTER
February 28, 2024      No Recipients     Patient: Trinity Smith   YOB: 1965   Date of Visit: 2/28/2024       Dear Dr. Fraire Recipients:    Thank you for referring Trinity Smith to me for evaluation. Here is my assessment and plan of care:    Trinity Smith is a 58 year old female.    HPI:     HPI    Ep here for a diabetic eye exam. She states her vision feels like it has gotten worse for distance. She would like a new RX today.    Pt has been a diabetic for 6 years       Pt's diabetes is currently controlled by pills   Pt checks BS daily   Pt's last blood sugar was 89 on 02/26/24    Last HA1C was 7.1 on 10/14/23  Endocrinologist: none      Last edited by Denia Solorzano on 2/28/2024 11:14 AM.        Patient History:  Past Medical History:   Diagnosis Date    Diabetes (HCC)     Essential hypertension     Hyperlipidemia        Surgical History: Trinity Smith has a past surgical history that includes tubal ligation and colonoscopy (2018).    Family History   Problem Relation Age of Onset    Diabetes Father     Diabetes Mother     Diabetes Daughter     Hypertension Daughter     Diabetes Son     Hypertension Son     Breast Cancer Neg     Ovarian Cancer Neg     Glaucoma Neg     Macular degeneration Neg        Social History:   Social History     Socioeconomic History    Marital status:    Tobacco Use    Smoking status: Never    Smokeless tobacco: Never   Vaping Use    Vaping Use: Never used   Substance and Sexual Activity    Alcohol use: Yes     Comment: social    Drug use: Never       Medications:  Current Outpatient Medications   Medication Sig Dispense Refill    empagliflozin (JARDIANCE) 10 MG Oral Tab Take 1 tablet (10 mg total) by mouth daily. 90 tablet 4    metFORMIN HCl 1000 MG Oral Tab TAKE 1 TABLET BY MOUTH TWICE DAILY AFTER MEALS 180 tablet 4    FARXIGA 10 MG Oral Tab       telmisartan 80 MG Oral Tab Take 1 tablet (80 mg total) by mouth daily. 90 tablet  4    hydroCHLOROthiazide 25 MG Oral Tab Take 1 tablet (25 mg total) by mouth daily. 90 tablet 4    metoprolol succinate ER 50 MG Oral Tablet 24 Hr Take 1 tablet (50 mg total) by mouth daily. 90 tablet 4    ergocalciferol 1.25 MG (51326 UT) Oral Cap TAKE 1 CAPSULE BY MOUTH EVERY WEEK 12 capsule 3    Fenofibrate 160 MG Oral Tab TAKE 1 TABLET BY MOUTH EVERY DAY 90 tablet 4    Lancets Does not apply Misc Check glucose daily 100 each 4    Glucose Blood (ONETOUCH ULTRA) In Vitro Strip Check glucose daily. 100 each 4    Blood Glucose Monitoring Suppl Does not apply Kit Glucose meter - covered by insurance. 100 lancets and strips with 5 refills. Check glucose daily 1 kit 0    Coenzyme Q10 100 MG Oral Cap  (Patient not taking: Reported on 3/5/2022) 30 capsule 0       Allergies:  Allergies   Allergen Reactions    Atorvastatin SWELLING    Lisinopril Coughing       ROS:       PHYSICAL EXAM:     Base Eye Exam       Visual Acuity (Snellen - Linear)         Right Left    Dist sc 20/50 +2 20/40 -2    Dist cc 20/30 +1 20/30 +2    Dist ph sc 20/25 -3 2030    Near cc 20/30 20/40   NVA done with OTC reading glasses +2.75             Tonometry (Icare, 10:56 AM)         Right Left    Pressure 20 20              Pupils         Pupils    Right PERRL    Left PERRL              Visual Fields         Left Right     Full Full              Extraocular Movement         Right Left     Full, Ortho Full, Ortho              Neuro/Psych       Oriented x3: Yes              Dilation       Both eyes: 1.0% Mydriacyl and 2.5% Warner Synephrine @ 10:55 AM              Dilation #2       Both eyes: 1.0% Mydriacyl and 2.5% Warner Synephrine @ 10:56 AM                  Slit Lamp and Fundus Exam       Slit Lamp Exam         Right Left    Lids/Lashes Dermatochalasis, Meibomian gland dysfunction Dermatochalasis, Meibomian gland dysfunction    Conjunctiva/Sclera Normal Normal    Cornea Clear Clear    Anterior Chamber Deep and quiet Deep and quiet    Iris Normal Normal     Lens Trace Nuclear sclerosis Trace Nuclear sclerosis    Vitreous Vitreous floaters Vitreous floaters              Fundus Exam         Right Left    Disc Good rim, Temporal crescent Good rim, Temporal crescent    C/D Ratio 0.4 0.4    Macula Normal- no BDR Normal- no BDR    Vessels Normal Normal    Periphery Normal Normal                  Refraction       Wearing Rx         Sphere Cylinder Axis    Right +2.75 Sphere     Left +2.75 Sphere       Age: 3yr    Type: OTC reading only              Wearing Rx #2         Sphere Cylinder Axis    Right +1.00 +0.50 100    Left +0.75 +0.50 080      Age: 7yrs    Type: Distance only              Manifest Refraction (Auto)         Sphere Cylinder [place] Dist VA Add Near VA    Right +1.75 +0.50 090       Left +1.50 +0.75 070                 Manifest Refraction #2         Sphere Cylinder [place] Dist VA Add Near VA    Right +1.50 +0.50 095 20/20 +2.00 20/20    Left +1.25 +0.75 070 20/20 +2.00 20/20              Final Rx         Sphere Cylinder [place] Dist VA Near VA    Right +1.50 +0.50 095 20/20     Left +1.25 +0.75 070 20/20       Type: Distance only              Final Rx #2         Sphere Cylinder [place] Dist VA Near VA    Right +3.50 +0.50 095  20/20    Left +3.25 +0.75 070  20/20      Type: Reading only                     ASSESSMENT/PLAN:     Diagnoses and Plan:     Diabetes mellitus type 2 without retinopathy (HCC)  Diabetes type II: no background of retinopathy, no signs of neovascularization noted.  Discussed ocular and systemic benefits of blood sugar control.  Diagnosis and treatment discussed in detail with patient.      Floater, vitreous, bilateral  No treatment.     Age-related nuclear cataract of both eyes  Discussed mild cataracts in both eyes that are not affecting vision and are not surgical at this time.    RX for separate distance and reading glasses per patient's choice.   Patient will get distance only and continue with over the counter glasses for reading.  She could  increase her over the counter reading only glasses to +3.50.      No orders of the defined types were placed in this encounter.      Meds This Visit:  Requested Prescriptions      No prescriptions requested or ordered in this encounter        Follow up instructions:  Return in about 1 year (around 2/28/2025) for Diabetic eye exam.    2/28/2024  Scribed by: Michael Colón MD        If you have questions, please do not hesitate to call me. I look forward to following Trinity along with you.    Sincerely,        Michael Colón MD        CC:   No Recipients    Document electronically generated by: Michael Colón MD

## (undated) NOTE — LETTER
10/20/2020              [facility]        [address]         Dear Vicente Bassett,    It was a pleasure to see you. Your mammogram was normal. Plan to repeat in 1 year. Sincerely,    Amber Medina.  Padmini Berry MD  [facility] [address] [place], [place] [facility]

## (undated) NOTE — LETTER
12/09/21    Tio Patton  [address]      Dear Jr Esparza records indicate that you have outstanding lab work and or testing that was ordered for you and has not yet been completed:  Orders Placed This Encounter      CBC Wit

## (undated) NOTE — LETTER
AUTHORIZATION FOR SURGICAL OPERATION OR OTHER PROCEDURE    1. I hereby authorize Miguel Sepulveda, and [facility] staff assigned to my case to perform the following operation and/or procedure at the [facility] site: Vulva Biopsy     ________________________________________________________________________________    2.  My physician has explained the nature and purpose of the operation or other procedure, possible alternative methods of treatment, the risks involved, and the possibility of complication to me.  I acknowledge that no guarantee has been made as to the result that may be obtained.  3.  I recognize that, during the course of this operation, or other procedure, unforseen conditions may necessitate additional or different procedure than those listed above.  I, therefore, further authorize and request that the above named physician, his/her physician assistants or designees perform such procedures as are, in his/her professional opinion, necessary and desirable.  4.  Any tissue or organs removed in the operation or other procedure may be disposed of by and at the discretion of the [facility] and [facility].  5.  I understand that in the event of a medical emergency, I will be transported by local paramedics to [facility] or other hospital emergency department.  6.  I certify that I have read and fully understand the above consent to operation and/or other procedure.    7.  I acknowledge that my physician has explained sedation/analgesia administration to me including the risks and benefits.  I consent to the administration of sedation/analgesia as may be necessary or desirable in the judgement of my physician.    Witness signature: ___________________________________________________ Date:  _04__/02__/25__                    Time:  ________ A.M.  P.M.       Patient Name:  ______________________________________________________  (please  print)      Patient signature:  ___________________________________________________             Relationship to Patient:           []  Parent    Responsible person                          []  Spouse  In case of minor or                    [] Other  _____________   Incompetent name:  __________________________________________________                               (please print)      _____________      Responsible person  In case of minor or  Incompetent signature:  _______________________________________________    Statement of Physician  My signature below affirms that prior to the time of the procedure, I have explained to the patient and/or his/her guardian, the risks and benefits involved in the proposed treatment and any reasonable alternative to the proposed treatment.  I have also explained the risks and benefits involved in the refusal of the proposed treatment and have answered the patient's questions.                        Date:  04__/_02_/_25____  Provider                      Signature:  __________________________________________________________       Time:  ___________ A.M    P.M.